# Patient Record
Sex: MALE | Race: WHITE | Employment: OTHER | URBAN - METROPOLITAN AREA
[De-identification: names, ages, dates, MRNs, and addresses within clinical notes are randomized per-mention and may not be internally consistent; named-entity substitution may affect disease eponyms.]

---

## 2019-01-28 ENCOUNTER — HOSPITAL ENCOUNTER (OUTPATIENT)
Dept: LAB | Facility: CLINIC | Age: 84
End: 2019-01-28
Attending: ORTHOPAEDIC SURGERY

## 2019-02-27 RX ORDER — HYDROCHLOROTHIAZIDE 25 MG/1
37.5 TABLET ORAL DAILY
COMMUNITY

## 2019-03-01 RX ORDER — LANOLIN ALCOHOL/MO/W.PET/CERES
400 CREAM (GRAM) TOPICAL DAILY
COMMUNITY

## 2019-03-01 RX ORDER — ASPIRIN 81 MG/1
81 TABLET ORAL DAILY
Status: ON HOLD | COMMUNITY
End: 2019-03-06

## 2019-03-01 RX ORDER — MULTIVIT-MIN/IRON/FOLIC ACID/K 18-600-40
1000 CAPSULE ORAL DAILY
COMMUNITY

## 2019-03-01 RX ORDER — UBIDECARENONE 100 MG
100 CAPSULE ORAL DAILY
COMMUNITY

## 2019-03-01 RX ORDER — METAPROTERENOL SULFATE 10 MG
500 TABLET ORAL DAILY
COMMUNITY

## 2019-03-01 RX ORDER — FLUTICASONE PROPIONATE 50 MCG
2 SPRAY, SUSPENSION (ML) NASAL DAILY
COMMUNITY

## 2019-03-04 ENCOUNTER — APPOINTMENT (OUTPATIENT)
Dept: GENERAL RADIOLOGY | Facility: CLINIC | Age: 84
DRG: 470 | End: 2019-03-04
Attending: ORTHOPAEDIC SURGERY
Payer: MEDICARE

## 2019-03-04 ENCOUNTER — ANESTHESIA (OUTPATIENT)
Dept: SURGERY | Facility: CLINIC | Age: 84
DRG: 470 | End: 2019-03-04
Payer: MEDICARE

## 2019-03-04 ENCOUNTER — ANESTHESIA EVENT (OUTPATIENT)
Dept: SURGERY | Facility: CLINIC | Age: 84
DRG: 470 | End: 2019-03-04
Payer: MEDICARE

## 2019-03-04 ENCOUNTER — HOSPITAL ENCOUNTER (INPATIENT)
Facility: CLINIC | Age: 84
LOS: 3 days | Discharge: HOME | DRG: 470 | End: 2019-03-07
Attending: ORTHOPAEDIC SURGERY | Admitting: ORTHOPAEDIC SURGERY
Payer: MEDICARE

## 2019-03-04 ENCOUNTER — APPOINTMENT (OUTPATIENT)
Dept: PHYSICAL THERAPY | Facility: CLINIC | Age: 84
DRG: 470 | End: 2019-03-04
Attending: ORTHOPAEDIC SURGERY
Payer: MEDICARE

## 2019-03-04 DIAGNOSIS — Z96.659 STATUS POST TOTAL KNEE REPLACEMENT, UNSPECIFIED LATERALITY: Primary | ICD-10-CM

## 2019-03-04 LAB
ANION GAP SERPL CALCULATED.3IONS-SCNC: 8 MMOL/L (ref 3–14)
BUN SERPL-MCNC: 27 MG/DL (ref 7–30)
CALCIUM SERPL-MCNC: 9 MG/DL (ref 8.5–10.1)
CHLORIDE SERPL-SCNC: 108 MMOL/L (ref 94–109)
CO2 SERPL-SCNC: 26 MMOL/L (ref 20–32)
CREAT SERPL-MCNC: 1.03 MG/DL (ref 0.66–1.25)
GFR SERPL CREATININE-BSD FRML MDRD: 67 ML/MIN/{1.73_M2}
GLUCOSE SERPL-MCNC: 101 MG/DL (ref 70–99)
HGB BLD-MCNC: 14.8 G/DL (ref 13.3–17.7)
PLATELET # BLD AUTO: 220 10E9/L (ref 150–450)
POTASSIUM SERPL-SCNC: 3.5 MMOL/L (ref 3.4–5.3)
SODIUM SERPL-SCNC: 142 MMOL/L (ref 133–144)

## 2019-03-04 PROCEDURE — 97161 PT EVAL LOW COMPLEX 20 MIN: CPT | Mod: GP

## 2019-03-04 PROCEDURE — C1776 JOINT DEVICE (IMPLANTABLE): HCPCS | Performed by: ORTHOPAEDIC SURGERY

## 2019-03-04 PROCEDURE — 37000008 ZZH ANESTHESIA TECHNICAL FEE, 1ST 30 MIN: Performed by: ORTHOPAEDIC SURGERY

## 2019-03-04 PROCEDURE — 36000093 ZZH SURGERY LEVEL 4 1ST 30 MIN: Performed by: ORTHOPAEDIC SURGERY

## 2019-03-04 PROCEDURE — 25000128 H RX IP 250 OP 636: Performed by: ORTHOPAEDIC SURGERY

## 2019-03-04 PROCEDURE — 25000128 H RX IP 250 OP 636: Performed by: NURSE ANESTHETIST, CERTIFIED REGISTERED

## 2019-03-04 PROCEDURE — 25800030 ZZH RX IP 258 OP 636: Performed by: ORTHOPAEDIC SURGERY

## 2019-03-04 PROCEDURE — 25000128 H RX IP 250 OP 636: Performed by: ANESTHESIOLOGY

## 2019-03-04 PROCEDURE — 25000125 ZZHC RX 250: Performed by: ORTHOPAEDIC SURGERY

## 2019-03-04 PROCEDURE — 85049 AUTOMATED PLATELET COUNT: CPT | Performed by: ORTHOPAEDIC SURGERY

## 2019-03-04 PROCEDURE — 25800030 ZZH RX IP 258 OP 636: Performed by: ANESTHESIOLOGY

## 2019-03-04 PROCEDURE — 37000009 ZZH ANESTHESIA TECHNICAL FEE, EACH ADDTL 15 MIN: Performed by: ORTHOPAEDIC SURGERY

## 2019-03-04 PROCEDURE — 80048 BASIC METABOLIC PNL TOTAL CA: CPT | Performed by: ORTHOPAEDIC SURGERY

## 2019-03-04 PROCEDURE — 25000125 ZZHC RX 250: Performed by: NURSE ANESTHETIST, CERTIFIED REGISTERED

## 2019-03-04 PROCEDURE — 27810169 ZZH OR IMPLANT GENERAL: Performed by: ORTHOPAEDIC SURGERY

## 2019-03-04 PROCEDURE — 97530 THERAPEUTIC ACTIVITIES: CPT | Mod: GP

## 2019-03-04 PROCEDURE — 27210794 ZZH OR GENERAL SUPPLY STERILE: Performed by: ORTHOPAEDIC SURGERY

## 2019-03-04 PROCEDURE — 12000000 ZZH R&B MED SURG/OB

## 2019-03-04 PROCEDURE — 97110 THERAPEUTIC EXERCISES: CPT | Mod: GP

## 2019-03-04 PROCEDURE — 36000063 ZZH SURGERY LEVEL 4 EA 15 ADDTL MIN: Performed by: ORTHOPAEDIC SURGERY

## 2019-03-04 PROCEDURE — 36415 COLL VENOUS BLD VENIPUNCTURE: CPT | Performed by: ORTHOPAEDIC SURGERY

## 2019-03-04 PROCEDURE — 25000132 ZZH RX MED GY IP 250 OP 250 PS 637: Mod: GY | Performed by: ORTHOPAEDIC SURGERY

## 2019-03-04 PROCEDURE — 25800030 ZZH RX IP 258 OP 636: Performed by: NURSE ANESTHETIST, CERTIFIED REGISTERED

## 2019-03-04 PROCEDURE — 0SRD0J9 REPLACEMENT OF LEFT KNEE JOINT WITH SYNTHETIC SUBSTITUTE, CEMENTED, OPEN APPROACH: ICD-10-PCS | Performed by: ORTHOPAEDIC SURGERY

## 2019-03-04 PROCEDURE — 40000170 ZZH STATISTIC PRE-PROCEDURE ASSESSMENT II: Performed by: ORTHOPAEDIC SURGERY

## 2019-03-04 PROCEDURE — 27110028 ZZH OR GENERAL SUPPLY NON-STERILE: Performed by: ORTHOPAEDIC SURGERY

## 2019-03-04 PROCEDURE — A9270 NON-COVERED ITEM OR SERVICE: HCPCS | Mod: GY | Performed by: ORTHOPAEDIC SURGERY

## 2019-03-04 PROCEDURE — 85018 HEMOGLOBIN: CPT | Performed by: ORTHOPAEDIC SURGERY

## 2019-03-04 PROCEDURE — 71000012 ZZH RECOVERY PHASE 1 LEVEL 1 FIRST HR: Performed by: ORTHOPAEDIC SURGERY

## 2019-03-04 PROCEDURE — 40000986 XR KNEE PORT LT 1/2 VW: Mod: LT

## 2019-03-04 PROCEDURE — 97116 GAIT TRAINING THERAPY: CPT | Mod: GP

## 2019-03-04 DEVICE — IMP COMP FEMORAL S&N LEGION PS NP SZ7 LT 71423227: Type: IMPLANTABLE DEVICE | Site: KNEE | Status: FUNCTIONAL

## 2019-03-04 DEVICE — IMP COMP PATELLA SNN GEN II RESURFACING 38MM 71926225: Type: IMPLANTABLE DEVICE | Site: KNEE | Status: FUNCTIONAL

## 2019-03-04 DEVICE — BONE CEMENT RADIOPAQUE SIMPLEX HV FULL DOSE 6194-1-001: Type: IMPLANTABLE DEVICE | Site: KNEE | Status: FUNCTIONAL

## 2019-03-04 DEVICE — IMP BASEPLATE TIBIAL GENESIS II SZ 6 LT TI 71420170: Type: IMPLANTABLE DEVICE | Site: KNEE | Status: FUNCTIONAL

## 2019-03-04 DEVICE — IMP INSERT TIB S&N LGN PS HI FLEX XLPE SZ5-6 15MM 71453224: Type: IMPLANTABLE DEVICE | Site: KNEE | Status: FUNCTIONAL

## 2019-03-04 RX ORDER — PROPOFOL 10 MG/ML
INJECTION, EMULSION INTRAVENOUS CONTINUOUS PRN
Status: DISCONTINUED | OUTPATIENT
Start: 2019-03-04 | End: 2019-03-04

## 2019-03-04 RX ORDER — ACETAMINOPHEN 325 MG/1
650 TABLET ORAL EVERY 4 HOURS PRN
Status: DISCONTINUED | OUTPATIENT
Start: 2019-03-07 | End: 2019-03-07 | Stop reason: HOSPADM

## 2019-03-04 RX ORDER — PROCHLORPERAZINE MALEATE 5 MG
5 TABLET ORAL EVERY 6 HOURS PRN
Status: DISCONTINUED | OUTPATIENT
Start: 2019-03-04 | End: 2019-03-07 | Stop reason: HOSPADM

## 2019-03-04 RX ORDER — ONDANSETRON 4 MG/1
4 TABLET, ORALLY DISINTEGRATING ORAL EVERY 30 MIN PRN
Status: DISCONTINUED | OUTPATIENT
Start: 2019-03-04 | End: 2019-03-04 | Stop reason: HOSPADM

## 2019-03-04 RX ORDER — ONDANSETRON 2 MG/ML
INJECTION INTRAMUSCULAR; INTRAVENOUS PRN
Status: DISCONTINUED | OUTPATIENT
Start: 2019-03-04 | End: 2019-03-04

## 2019-03-04 RX ORDER — FENTANYL CITRATE 50 UG/ML
INJECTION, SOLUTION INTRAMUSCULAR; INTRAVENOUS PRN
Status: DISCONTINUED | OUTPATIENT
Start: 2019-03-04 | End: 2019-03-04

## 2019-03-04 RX ORDER — BUPIVACAINE HYDROCHLORIDE 7.5 MG/ML
INJECTION, SOLUTION INTRASPINAL PRN
Status: DISCONTINUED | OUTPATIENT
Start: 2019-03-04 | End: 2019-03-04

## 2019-03-04 RX ORDER — HYDROMORPHONE HYDROCHLORIDE 1 MG/ML
.3-.5 INJECTION, SOLUTION INTRAMUSCULAR; INTRAVENOUS; SUBCUTANEOUS EVERY 5 MIN PRN
Status: DISCONTINUED | OUTPATIENT
Start: 2019-03-04 | End: 2019-03-04 | Stop reason: HOSPADM

## 2019-03-04 RX ORDER — MAGNESIUM HYDROXIDE 1200 MG/15ML
LIQUID ORAL PRN
Status: DISCONTINUED | OUTPATIENT
Start: 2019-03-04 | End: 2019-03-04 | Stop reason: HOSPADM

## 2019-03-04 RX ORDER — DEXTROSE MONOHYDRATE, SODIUM CHLORIDE, AND POTASSIUM CHLORIDE 50; 1.49; 4.5 G/1000ML; G/1000ML; G/1000ML
INJECTION, SOLUTION INTRAVENOUS CONTINUOUS
Status: DISCONTINUED | OUTPATIENT
Start: 2019-03-04 | End: 2019-03-05 | Stop reason: CLARIF

## 2019-03-04 RX ORDER — LIDOCAINE 40 MG/G
CREAM TOPICAL
Status: DISCONTINUED | OUTPATIENT
Start: 2019-03-04 | End: 2019-03-07

## 2019-03-04 RX ORDER — FENTANYL CITRATE 50 UG/ML
25-50 INJECTION, SOLUTION INTRAMUSCULAR; INTRAVENOUS
Status: DISCONTINUED | OUTPATIENT
Start: 2019-03-04 | End: 2019-03-04 | Stop reason: HOSPADM

## 2019-03-04 RX ORDER — ONDANSETRON 4 MG/1
4 TABLET, ORALLY DISINTEGRATING ORAL EVERY 6 HOURS PRN
Status: DISCONTINUED | OUTPATIENT
Start: 2019-03-04 | End: 2019-03-07 | Stop reason: HOSPADM

## 2019-03-04 RX ORDER — CEFAZOLIN SODIUM 1 G/3ML
1 INJECTION, POWDER, FOR SOLUTION INTRAMUSCULAR; INTRAVENOUS SEE ADMIN INSTRUCTIONS
Status: DISCONTINUED | OUTPATIENT
Start: 2019-03-04 | End: 2019-03-04

## 2019-03-04 RX ORDER — FENTANYL CITRATE 50 UG/ML
100 INJECTION, SOLUTION INTRAMUSCULAR; INTRAVENOUS ONCE
Status: COMPLETED | OUTPATIENT
Start: 2019-03-04 | End: 2019-03-04

## 2019-03-04 RX ORDER — AMOXICILLIN 250 MG
1 CAPSULE ORAL 2 TIMES DAILY
Status: DISCONTINUED | OUTPATIENT
Start: 2019-03-04 | End: 2019-03-07 | Stop reason: HOSPADM

## 2019-03-04 RX ORDER — NALOXONE HYDROCHLORIDE 0.4 MG/ML
.1-.4 INJECTION, SOLUTION INTRAMUSCULAR; INTRAVENOUS; SUBCUTANEOUS
Status: DISCONTINUED | OUTPATIENT
Start: 2019-03-04 | End: 2019-03-07 | Stop reason: HOSPADM

## 2019-03-04 RX ORDER — DEXAMETHASONE SODIUM PHOSPHATE 4 MG/ML
4 INJECTION, SOLUTION INTRA-ARTICULAR; INTRALESIONAL; INTRAMUSCULAR; INTRAVENOUS; SOFT TISSUE
Status: DISCONTINUED | OUTPATIENT
Start: 2019-03-04 | End: 2019-03-04 | Stop reason: HOSPADM

## 2019-03-04 RX ORDER — ONDANSETRON 2 MG/ML
4 INJECTION INTRAMUSCULAR; INTRAVENOUS EVERY 6 HOURS PRN
Status: DISCONTINUED | OUTPATIENT
Start: 2019-03-04 | End: 2019-03-07 | Stop reason: HOSPADM

## 2019-03-04 RX ORDER — ACETAMINOPHEN 325 MG/1
975 TABLET ORAL EVERY 8 HOURS
Status: COMPLETED | OUTPATIENT
Start: 2019-03-04 | End: 2019-03-07

## 2019-03-04 RX ORDER — SODIUM CHLORIDE, SODIUM LACTATE, POTASSIUM CHLORIDE, CALCIUM CHLORIDE 600; 310; 30; 20 MG/100ML; MG/100ML; MG/100ML; MG/100ML
INJECTION, SOLUTION INTRAVENOUS CONTINUOUS
Status: DISCONTINUED | OUTPATIENT
Start: 2019-03-04 | End: 2019-03-04 | Stop reason: HOSPADM

## 2019-03-04 RX ORDER — DIPHENHYDRAMINE HYDROCHLORIDE 50 MG/ML
12.5 INJECTION INTRAMUSCULAR; INTRAVENOUS EVERY 6 HOURS PRN
Status: DISCONTINUED | OUTPATIENT
Start: 2019-03-04 | End: 2019-03-07 | Stop reason: HOSPADM

## 2019-03-04 RX ORDER — SODIUM CHLORIDE, SODIUM LACTATE, POTASSIUM CHLORIDE, CALCIUM CHLORIDE 600; 310; 30; 20 MG/100ML; MG/100ML; MG/100ML; MG/100ML
INJECTION, SOLUTION INTRAVENOUS CONTINUOUS
Status: DISCONTINUED | OUTPATIENT
Start: 2019-03-04 | End: 2019-03-04 | Stop reason: ALTCHOICE

## 2019-03-04 RX ORDER — ONDANSETRON 2 MG/ML
4 INJECTION INTRAMUSCULAR; INTRAVENOUS EVERY 30 MIN PRN
Status: DISCONTINUED | OUTPATIENT
Start: 2019-03-04 | End: 2019-03-04 | Stop reason: HOSPADM

## 2019-03-04 RX ORDER — METHOCARBAMOL 500 MG/1
500 TABLET, FILM COATED ORAL 4 TIMES DAILY PRN
Status: DISCONTINUED | OUTPATIENT
Start: 2019-03-04 | End: 2019-03-07 | Stop reason: HOSPADM

## 2019-03-04 RX ORDER — EPHEDRINE SULFATE 50 MG/ML
INJECTION, SOLUTION INTRAMUSCULAR; INTRAVENOUS; SUBCUTANEOUS PRN
Status: DISCONTINUED | OUTPATIENT
Start: 2019-03-04 | End: 2019-03-04

## 2019-03-04 RX ORDER — HYDROMORPHONE HYDROCHLORIDE 1 MG/ML
.3-.5 INJECTION, SOLUTION INTRAMUSCULAR; INTRAVENOUS; SUBCUTANEOUS
Status: DISCONTINUED | OUTPATIENT
Start: 2019-03-04 | End: 2019-03-07 | Stop reason: HOSPADM

## 2019-03-04 RX ORDER — AMOXICILLIN 250 MG
2 CAPSULE ORAL 2 TIMES DAILY
Status: DISCONTINUED | OUTPATIENT
Start: 2019-03-04 | End: 2019-03-07 | Stop reason: HOSPADM

## 2019-03-04 RX ORDER — CEFAZOLIN SODIUM 1 G/3ML
INJECTION, POWDER, FOR SOLUTION INTRAMUSCULAR; INTRAVENOUS PRN
Status: DISCONTINUED | OUTPATIENT
Start: 2019-03-04 | End: 2019-03-04

## 2019-03-04 RX ORDER — OXYCODONE HYDROCHLORIDE 5 MG/1
5-10 TABLET ORAL
Status: DISCONTINUED | OUTPATIENT
Start: 2019-03-04 | End: 2019-03-06

## 2019-03-04 RX ORDER — NALOXONE HYDROCHLORIDE 0.4 MG/ML
.1-.4 INJECTION, SOLUTION INTRAMUSCULAR; INTRAVENOUS; SUBCUTANEOUS
Status: ACTIVE | OUTPATIENT
Start: 2019-03-04 | End: 2019-03-05

## 2019-03-04 RX ORDER — LABETALOL 20 MG/4 ML (5 MG/ML) INTRAVENOUS SYRINGE
10
Status: DISCONTINUED | OUTPATIENT
Start: 2019-03-04 | End: 2019-03-04 | Stop reason: HOSPADM

## 2019-03-04 RX ORDER — DIPHENHYDRAMINE HCL 12.5MG/5ML
12.5 LIQUID (ML) ORAL EVERY 6 HOURS PRN
Status: DISCONTINUED | OUTPATIENT
Start: 2019-03-04 | End: 2019-03-07 | Stop reason: HOSPADM

## 2019-03-04 RX ORDER — CEFAZOLIN SODIUM 2 G/100ML
2 INJECTION, SOLUTION INTRAVENOUS EVERY 8 HOURS
Status: COMPLETED | OUTPATIENT
Start: 2019-03-04 | End: 2019-03-05

## 2019-03-04 RX ORDER — HYDRALAZINE HYDROCHLORIDE 20 MG/ML
2.5-5 INJECTION INTRAMUSCULAR; INTRAVENOUS EVERY 10 MIN PRN
Status: DISCONTINUED | OUTPATIENT
Start: 2019-03-04 | End: 2019-03-04 | Stop reason: HOSPADM

## 2019-03-04 RX ORDER — CEFAZOLIN SODIUM 2 G/100ML
2 INJECTION, SOLUTION INTRAVENOUS
Status: COMPLETED | OUTPATIENT
Start: 2019-03-04 | End: 2019-03-04

## 2019-03-04 RX ADMIN — SODIUM CHLORIDE 1 G: 9 INJECTION, SOLUTION INTRAVENOUS at 07:47

## 2019-03-04 RX ADMIN — DEXMEDETOMIDINE HYDROCHLORIDE 4 MCG: 100 INJECTION, SOLUTION INTRAVENOUS at 09:12

## 2019-03-04 RX ADMIN — SODIUM CHLORIDE, POTASSIUM CHLORIDE, SODIUM LACTATE AND CALCIUM CHLORIDE: 600; 310; 30; 20 INJECTION, SOLUTION INTRAVENOUS at 06:11

## 2019-03-04 RX ADMIN — ONDANSETRON 4 MG: 2 INJECTION INTRAMUSCULAR; INTRAVENOUS at 09:14

## 2019-03-04 RX ADMIN — ACETAMINOPHEN 975 MG: 325 TABLET, FILM COATED ORAL at 16:25

## 2019-03-04 RX ADMIN — POTASSIUM CHLORIDE, DEXTROSE MONOHYDRATE AND SODIUM CHLORIDE: 150; 5; 450 INJECTION, SOLUTION INTRAVENOUS at 11:45

## 2019-03-04 RX ADMIN — HYDROCHLOROTHIAZIDE 37.5 MG: 25 TABLET ORAL at 13:51

## 2019-03-04 RX ADMIN — OXYCODONE HYDROCHLORIDE 5 MG: 5 TABLET ORAL at 21:43

## 2019-03-04 RX ADMIN — CEFAZOLIN 1 G: 1 INJECTION, POWDER, FOR SOLUTION INTRAMUSCULAR; INTRAVENOUS at 09:22

## 2019-03-04 RX ADMIN — Medication 5 MG: at 08:30

## 2019-03-04 RX ADMIN — BUPIVACAINE HYDROCHLORIDE IN DEXTROSE 12.5 MG: 7.5 INJECTION, SOLUTION SUBARACHNOID at 07:35

## 2019-03-04 RX ADMIN — Medication 5 MG: at 08:13

## 2019-03-04 RX ADMIN — SENNOSIDES AND DOCUSATE SODIUM 2 TABLET: 8.6; 5 TABLET ORAL at 21:43

## 2019-03-04 RX ADMIN — FENTANYL CITRATE 25 MCG: 50 INJECTION, SOLUTION INTRAMUSCULAR; INTRAVENOUS at 09:16

## 2019-03-04 RX ADMIN — DEXMEDETOMIDINE HYDROCHLORIDE 4 MCG: 100 INJECTION, SOLUTION INTRAVENOUS at 09:09

## 2019-03-04 RX ADMIN — SODIUM CHLORIDE, POTASSIUM CHLORIDE, SODIUM LACTATE AND CALCIUM CHLORIDE: 600; 310; 30; 20 INJECTION, SOLUTION INTRAVENOUS at 06:46

## 2019-03-04 RX ADMIN — OXYCODONE HYDROCHLORIDE 5 MG: 5 TABLET ORAL at 16:28

## 2019-03-04 RX ADMIN — FENTANYL CITRATE 50 MCG: 50 INJECTION, SOLUTION INTRAMUSCULAR; INTRAVENOUS at 07:44

## 2019-03-04 RX ADMIN — FENTANYL CITRATE 25 MCG: 50 INJECTION, SOLUTION INTRAMUSCULAR; INTRAVENOUS at 09:15

## 2019-03-04 RX ADMIN — HYDROMORPHONE HYDROCHLORIDE 0.3 MG: 1 INJECTION, SOLUTION INTRAMUSCULAR; INTRAVENOUS; SUBCUTANEOUS at 12:49

## 2019-03-04 RX ADMIN — ACETAMINOPHEN 975 MG: 325 TABLET, FILM COATED ORAL at 21:43

## 2019-03-04 RX ADMIN — CEFAZOLIN SODIUM 2 G: 2 INJECTION, SOLUTION INTRAVENOUS at 07:35

## 2019-03-04 RX ADMIN — SODIUM CHLORIDE, POTASSIUM CHLORIDE, SODIUM LACTATE AND CALCIUM CHLORIDE: 600; 310; 30; 20 INJECTION, SOLUTION INTRAVENOUS at 07:58

## 2019-03-04 RX ADMIN — CEFAZOLIN SODIUM 2 G: 2 INJECTION, SOLUTION INTRAVENOUS at 16:27

## 2019-03-04 RX ADMIN — DEXMEDETOMIDINE HYDROCHLORIDE 4 MCG: 100 INJECTION, SOLUTION INTRAVENOUS at 07:34

## 2019-03-04 RX ADMIN — Medication 5 MG: at 07:56

## 2019-03-04 RX ADMIN — PROPOFOL 40 MCG/KG/MIN: 10 INJECTION, EMULSION INTRAVENOUS at 07:48

## 2019-03-04 RX ADMIN — Medication 0.5 MG: at 10:58

## 2019-03-04 RX ADMIN — Medication 5 MG: at 08:15

## 2019-03-04 RX ADMIN — Medication 5 MG: at 09:04

## 2019-03-04 RX ADMIN — Medication 0.5 MG: at 11:03

## 2019-03-04 RX ADMIN — FENTANYL CITRATE 50 MCG: 50 INJECTION, SOLUTION INTRAMUSCULAR; INTRAVENOUS at 06:56

## 2019-03-04 RX ADMIN — MIDAZOLAM HYDROCHLORIDE 1 MG: 1 INJECTION, SOLUTION INTRAMUSCULAR; INTRAVENOUS at 06:55

## 2019-03-04 RX ADMIN — SODIUM CHLORIDE 1 G: 9 INJECTION, SOLUTION INTRAVENOUS at 09:01

## 2019-03-04 RX ADMIN — DEXMEDETOMIDINE HYDROCHLORIDE 4 MCG: 100 INJECTION, SOLUTION INTRAVENOUS at 07:37

## 2019-03-04 ASSESSMENT — ACTIVITIES OF DAILY LIVING (ADL)
ADLS_ACUITY_SCORE: 13
ADLS_ACUITY_SCORE: 15
ADLS_ACUITY_SCORE: 13

## 2019-03-04 ASSESSMENT — MIFFLIN-ST. JEOR: SCORE: 1705.97

## 2019-03-04 NOTE — ANESTHESIA PROCEDURE NOTES
Peripheral nerve/Neuraxial procedure note : intrathecal  Pre-Procedure  Performed by Thong Upton MD  Location: OR      Pre-Anesthestic Checklist: patient identified, IV checked, site marked, risks and benefits discussed, informed consent, monitors and equipment checked, pre-op evaluation, at physician/surgeon's request and post-op pain management    Timeout  Correct Patient: Yes   Correct Procedure: Yes   Correct Site: Yes   Correct Laterality: N/A   Correct Position: Yes   Site Marked: N/A   .   Procedure Documentation    .    Procedure:    Intrathecal.  Insertion Site:L3-4  (midline approach)      Patient Prep;mask, sterile gloves, povidone-iodine 7.5% surgical scrub, patient draped.  .  Needle: Clarita-Shyam Spinal Needle (gauge): 24  Spinal/LP Needle Length (inches): 3 # of attempts: 2 and  # of redirects:  2 (0) Introducer used Introducer: 20 G .       Assessment/Narrative  Paresthesias: No.  .  .  clear CSF fluid removed . Comments:  12.5 mg 0.75% BUPIVACAINE WITH 8.25% DEXTROSE INJECTED. No paresthesia on injection. Patient tolerated the procedure well.

## 2019-03-04 NOTE — ANESTHESIA PROCEDURE NOTES
Peripheral nerve/Neuraxial procedure note : Adductor canal and Femoral  Pre-Procedure  Performed by Thong Upton MD  Location: pre-op      Pre-Anesthestic Checklist: patient identified, IV checked, site marked, risks and benefits discussed, informed consent, monitors and equipment checked, pre-op evaluation, at physician/surgeon's request and post-op pain management    Timeout  Correct Patient: Yes   Correct Procedure: Yes   Correct Site: Yes   Correct Laterality: Yes   Correct Position: Yes   Site Marked: Yes   .   Procedure Documentation    .    Procedure:  left  Adductor canal and Femoral.  Local skin infiltrated with 3 mL of 1% lidocaine.     Ultrasound used to identify targeted nerve, plexus, or vascular marker and placed a needle adjacent to it., Ultrasound was used to visualize the spread of the anesthetic in close proximity to the above stated nerve. A permanent image is entered into the patient's record.  Patient Prep;mask, sterile gloves, chlorhexidine gluconate and isopropyl alcohol.  .  Needle: insulated, short bevel ([place]) Needle Gauge: 21.  Needle Length (millimeters) 100  Insertion Method: Single Shot.       Assessment/Narrative  Paresthesias: No.  Injection made incrementally with aspirations every 5 mL..  The placement was negative for: blood aspirated, painful injection and site bleeding.  Bolus given via needle..   Secured via.   Complications: none. Test dose of mL at. Test dose negative for signs of intravascular, subdural or intrathecal injection. Comments:  0.5% Bupivacaine with 1:400,000 epinephrine injected. Patient tolerated the procedure well.    The surgeon has given a verbal order transferring care of this patient to me for the performance of a regional analgesia block for post operative pain control. It is requested of me because I am uniquely trained and qualified to perform this block and the surgeon is neither trained nor qualified to perform this procedure.

## 2019-03-04 NOTE — PROGRESS NOTES
03/04/19 1509   Quick Adds   Type of Visit Initial PT Evaluation   Living Environment   Lives With spouse   Living Arrangements house   Home Accessibility stairs to enter home   Number of Stairs, Main Entrance two   Transportation Anticipated family or friend will provide   Living Environment Comment Walk in shower, no rails on steps to enter home.  Patient and spouse drive.   Self-Care   Usual Activity Tolerance moderate   Current Activity Tolerance poor   Regular Exercise No   Equipment Currently Used at Home none   Activity/Exercise/Self-Care Comment Previously used to walker, no formal exercise.  Has walker and cane available at home.  No grab bars in bathroom.   Functional Level Prior   Ambulation 0-->independent   Transferring 0-->independent   Toileting 0-->independent   Bathing 0-->independent   Fall history within last six months no   General Information   Onset of Illness/Injury or Date of Surgery - Date 03/04/19   Referring Physician Seamus Mahan   Patient/Family Goals Statement Per BPCI plan, home with spouse assist and OP PT.  Pt reports looking forward to getting back to walking.     Pertinent History of Current Problem (include personal factors and/or comorbidities that impact the POC) Pt is an 82 y/o male s/p L TKA   Precautions/Limitations fall precautions  (KI at night)   Weight-Bearing Status - LLE weight-bearing as tolerated   General Observations Pt reports feeling lightheaded, requesting milk.  Discussed with RN and obtained milk for patient.  See vitals flowsheet for BP.   Cognitive Status Examination   Orientation orientation to person, place and time   Level of Consciousness alert   Follows Commands and Answers Questions 100% of the time   Pain Assessment   Patient Currently in Pain Yes, see Vital Sign flowsheet   Range of Motion (ROM)   ROM Comment L knee ROM limited into flexion and extension, consistent s/p TKA.  Otherwise WNL.   Strength   Strength Comments LLE strength apparent with  LE ex and functional mobility, consistent s/p TKA. Min A to complete SLR , good strength in UE and RLE observed with bed mobility, transfers   Bed Mobility   Bed Mobility Comments MIn A at LLE for sup<->sit   Transfer Skill:  Sit to Stand   Level of [place]: Sit/Stand minimum assist (75% patients effort)   Physical Assist/Nonphysical Assist: Sit/Stand verbal cues;nonverbal cues (demo/gestures);1 person + 1 person to manage equipment   Weightbearing Restrictions: Sit/Stand weight-bearing as tolerated   Assistive Device for Transfer: Sit/Stand rolling walker   Transfer Skill: Stand to Sit   Level of [place]: Stand/Sit minimum assist (75% patients effort)   Physical Assist/Nonphysical Assist: Stand/Sit verbal cues;nonverbal cues (demo/gestures);1 person + 1 person to manage equipment   Weight-Bearing Restrictions: Stand/Sit weight-bearing as tolerated   Assistive Device: Stand to Sit rolling walker   Gait   Gait Comments see daily doc for additional gait training   Gait Skills   Level of [place]: Gait minimum assist (75% patients effort)   Physical Assist/Nonphysical Assist: Gait verbal cues;nonverbal cues (demo/gestures);1 person + 1 person to manage equipment   Weight-Bearing Restrictions: Gait weight-bearing as tolerated   Assistive Device for Transfer: Gait rolling walker   Gait Distance (5')   Balance   Balance Comments Fair with mena UE support of walker   Muscle Tone   Muscle Tone no deficits were identified   General Therapy Interventions   Planned Therapy Interventions bed mobility training;gait training;ROM;strengthening;stretching;transfer training;progressive activity/exercise   Clinical Impression   Criteria for Skilled Therapeutic Intervention yes, treatment indicated   PT Diagnosis difficulty in gait s/p L TKA   Influenced by the following impairments decreased L knee ROM, decreased LLE strength   Functional limitations due to impairments decreased I with bed mobility, transfers, gait for  "home mobility   Clinical Presentation Stable/Uncomplicated   Clinical Presentation Rationale progressing as anticipated POD 0   Clinical Decision Making (Complexity) Low complexity   Therapy Frequency` 2 times/day   Predicted Duration of Therapy Intervention (days/wks) 3 days   Anticipated Equipment Needs at Discharge (pt has own walker and cane)   Anticipated Discharge Disposition Home with Outpatient Therapy   Risk & Benefits of therapy have been explained Yes   Patient, Family & other staff in agreement with plan of care Yes   [facility] AM-PAC  \"6 Clicks\" V.2 Basic Mobility Inpatient Short Form   1. Turning from your back to your side while in a flat bed without using bedrails? 4 - None   2. Moving from lying on your back to sitting on the side of a flat bed without using bedrails? 3 - A Little   3. Moving to and from a bed to a chair (including a wheelchair)? 3 - A Little   4. Standing up from a chair using your arms (e.g., wheelchair, or bedside chair)? 3 - A Little   5. To walk in hospital room? 3 - A Little   6. Climbing 3-5 steps with a railing? 2 - A Lot   Basic Mobility Raw Score (Score out of 24.Lower scores equate to lower levels of function) 18   Total Evaluation Time   Total Evaluation Time (Minutes) 25     "

## 2019-03-04 NOTE — PLAN OF CARE
VSS, CMS intact. Up and ambulated with PT. Pain well managed with oxycodone and tylenol. Dressing C/D/I. Voiding per urinal. A&Ox 4.

## 2019-03-04 NOTE — ANESTHESIA CARE TRANSFER NOTE
Patient: Alec Villanueva    Procedure(s):  LEFT TOTAL KNEE ARTHROPLASTY (MOTTA AND NEPHEW)^    Diagnosis: LEFT KNEE DJD  Diagnosis Additional Information: No value filed.    Anesthesia Type:   Spinal     Note:  Airway :Room Air  Patient transferred to:PACU  Handoff Report: Identifed the Patient, Identified the Reponsible Provider, Reviewed the pertinent medical history, Discussed the surgical course, Reviewed Intra-OP anesthesia mangement and issues during anesthesia, Set expectations for post-procedure period and Allowed opportunity for questions and acknowledgement of understanding      Vitals: (Last set prior to Anesthesia Care Transfer)    CRNA VITALS  3/4/2019 0916 - 3/4/2019 0952      3/4/2019             SpO2:  97 %    Resp Rate (set):  10                Electronically Signed By: LUKE May CRNA  March 4, 2019  9:52 AM

## 2019-03-04 NOTE — PLAN OF CARE
Discharge Planner PT   Patient plan for discharge: Home with OP PT and spouse assist, per BPCI plan  Current status: Min A for bed mobility, transfers and gait with walker, amb 15'. BP stable but patient reporting lightheadedness throughout limiting ambulation distance.  Overall moving well POD 0.  Barriers to return to prior living situation: Stairs- will address in PT, has assist of spouse at home, already has FWW and SEC at home  Recommendations for discharge: home with OP PT  Rationale for recommendations: Pt moving well POD 0, anticipate will be able to meet mobility goals for home and attend OP PT to progress knee strength and ROM       Entered by: Pao Lopes 03/04/2019 4:16 PM

## 2019-03-04 NOTE — ANESTHESIA PREPROCEDURE EVALUATION
Anesthesia Pre-Procedure Evaluation    Patient: Alec Villanueva   MRN: [id number] : 1935          Preoperative Diagnosis: LEFT KNEE DJD    Procedure(s):  LEFT TOTAL KNEE ARTHROPLASTY (SMITH AND NEPHEW)^    Past Medical History:   Diagnosis Date     Hyperlipidemia      Hypertension      Thyroid nodule      Past Surgical History:   Procedure Laterality Date     ENT SURGERY      nasal septum surgery     EYE SURGERY      cataracts     EYE SURGERY      capsulotomy     HERNIA REPAIR       ORTHOPEDIC SURGERY Bilateral     shoulder arthroscopy     THYROIDECTOMY      partial       Anesthesia Evaluation     . Pt has had prior anesthetic.     No history of anesthetic complications          ROS/MED HX    ENT/Pulmonary:      (-) sleep apnea   Neurologic:  - neg neurologic ROS     Cardiovascular:     (+) Dyslipidemia, hypertension----. : . . . :. .       METS/Exercise Tolerance:     Hematologic:         Musculoskeletal:         GI/Hepatic:  - neg GI/hepatic ROS      (-) GERD   Renal/Genitourinary:  - ROS Renal section negative       Endo:     (+) thyroid problem  Thyroid disease - Other, .      Psychiatric:         Infectious Disease:         Malignancy:         Other:                          Physical Exam  Normal systems: cardiovascular and pulmonary    Airway   Mallampati: II  TM distance: >3 FB  Neck ROM: full    Dental   (+) caps    Cardiovascular       Pulmonary             Lab Results   Component Value Date    HGB 14.8 2019     2019    POTASSIUM 3.5 2019    CHLORIDE 108 2019    CO2 26 2019    BUN 27 2019    CR 1.03 2019     (H) 2019    ERNESTINA 9.0 2019       Preop Vitals  BP Readings from Last 3 Encounters:   19 138/76    Pulse Readings from Last 3 Encounters:   19 70      Resp Readings from Last 3 Encounters:   19 12    SpO2 Readings from Last 3 Encounters:   19 95%      Temp Readings from Last 1 Encounters:   19 36.5  C (97.7  " F) (Temporal)    Ht Readings from Last 1 Encounters:   03/04/19 1.854 m (6' 1\")      Wt Readings from Last 1 Encounters:   03/04/19 95.7 kg (211 lb)    Estimated body mass index is 27.84 kg/m  as calculated from the following:    Height as of this encounter: 1.854 m (6' 1\").    Weight as of this encounter: 95.7 kg (211 lb).       Anesthesia Plan      History & Physical Review  History and physical reviewed and following examination; no interval change.    ASA Status:  2 .    NPO Status:  > 8 hours    Plan for Spinal   PONV prophylaxis:  Ondansetron (or other 5HT-3)       Postoperative Care  Postoperative pain management:  Multi-modal analgesia.      Consents  Anesthetic plan, risks, benefits and alternatives discussed with:  Patient..                 Thong Upton MD  "

## 2019-03-04 NOTE — ANESTHESIA POSTPROCEDURE EVALUATION
Patient: Alec Villanueva    Procedure(s):  LEFT TOTAL KNEE ARTHROPLASTY (MOTAT AND NEPHEW)^    Diagnosis:LEFT KNEE DJD  Diagnosis Additional Information: No value filed.    Anesthesia Type:  Spinal    Note:  Anesthesia Post Evaluation    Patient location during evaluation: bedside  Patient participation: Able to participate in evaluation but full recovery from regional anesthesia has not yet ocurrred but is anticipated to occur within 48 hours  Level of consciousness: awake  Pain management: adequate  Airway patency: patent  Cardiovascular status: acceptable  Respiratory status: acceptable  Hydration status: acceptable  PONV: none     Anesthetic complications: None          Last vitals:  Vitals:    03/04/19 1541 03/04/19 1628 03/04/19 1630   BP: 143/75  147/74   Pulse:   65   Resp: 15 15 15   Temp: 36.4  C (97.6  F)  36.4  C (97.5  F)   SpO2: 100%  97%         Electronically Signed By: Alec Busby DO, DO  March 4, 2019  4:48 PM

## 2019-03-04 NOTE — OP NOTE
PATIENT NAME:  Alec Villanueva  MEDICAL RECORD #: [id number]  PATIENT BIRTHDAY:  1935  DATE OF SURGERY: 3/4/2019    SURGEON:    Seamus Mahan MD    1st ASSISTANT:  HANY Pereira OPA-C    PREOPERATIVE DIAGNOSIS:  Degenerative osteoarthritis left knee.    POSTOPERATIVE DIAGNOSIS:  Degenerative osteoarthritis left knee.    PROCEDURE: left total knee arthroplasty.    COMPONENTS: [employer] - Size 7 PS femoral component, size 6 fully stemmed tibial baseplate with 15 mm PS XLPE high flexion articular insert, and 38 mm patellar component.    ANESTHESIA: Spinal     INDICATIONS FOR PROCEDURE:  The patient was brought to the operating room for elective total knee replacement for advanced degenerative osteoarthritis.  The patient received IV antibiotics preoperatively.  These will be continued for 24 hours.  The patient also will receive anticoagulants  for postoperative thrombosis prophylaxis.  The patient understands the indications, alternatives, risks, benefits, and time involved for recovery and wishes to proceed.  The patient is consented for the procedure.      DESCRIPTION OF PROCEDURE:  The patient was brought to the operating room  and following suitable Spinal anesthesia, the left knee was prepped and draped in the usual manner.  Full timeout was carried out and the patient and proper extremity and operative site identified and confirmed by all members of the operative team.    We next exsanguinated the operative leg with a Angelo bandage and the tourniquet was elevated to 350 mmHg on the thigh.    A 10 cm longitudinal incision was made anteriorly for the MIS technique.  Sharp dissection was carried down through the subcutaneous tissue.  A median parapatellar arthrotomy was carried out and the knee flexed to 90 degrees.    There was severe wear in the medial compartment and severe wear in the patellofemoral  compartment and moderate wear in the lateral compartment.    The [employer] instrumentation was  used.  The femur was cut for a size 7 PS  implant.  The tibia was cut for a size 6 fully stemmed base plate.  The patella was cut for a 38 mm patellar button.    The trial components were removed from the knee.  The bone surfaces were prepared with jet lavage and careful drying technique.     The posterior capsule was injected for postoperative relief with 30 cc of saline, 30 cc of 0.2% Ropivacaine, and 15 mg of Toradol.       We then cemented the components with HD Simplex cement beginning with the tibial baseplate, followed by the femoral component, followed by the patellar component.    The knee was held in extension with the trial insert in place in the tibia until the cement was set.  Once the cement was set up, the trial component was removed.  We selected the 15 mm PS XLPE high flexion articular insert.  This insert was secured and the knee checked one final time for motion, stability, alignment and soft tissue balance, all of which were excellent.    The wound was irrigated throughout with antibiotic solution using jet lavage.  The tourniquet was deflated prior to wound closure and all bleeding controlled with electrocautery.  We then re-exsanguinated the leg and reinflated the tourniquet during wound closure.    The wound was closed over a medium Hemovac drain with spaced 0 Ethibond interrupted and V-Loc running suture in the parapatellar arthrotomy, 2-0 undyed Vicryl in subcutaneous tissue and skin staples in the skin.  A sterile soft dressing was applied.  The tourniquet deflated one final time.  The patient was taken to the PAR in satisfactory condition.  There were no known complications during the procedure.    A surgical assistant was medically necessary for this procedure for pre-op positioning as well as intra-op retraction and extremity support and positioning.  He was present for the entire procedure.      STEPHANIE JORGENSEN MD    CC  Stephanie Jorgensen MD          [phone] Fax

## 2019-03-04 NOTE — PLAN OF CARE
Pt. A&o, vss, taking Iv dilaudid for pain, dressing CDI, hvc patent, DTV, PT at 3pm. Will continue to monitor.

## 2019-03-05 ENCOUNTER — APPOINTMENT (OUTPATIENT)
Dept: CT IMAGING | Facility: CLINIC | Age: 84
DRG: 470 | End: 2019-03-05
Attending: NURSE PRACTITIONER
Payer: MEDICARE

## 2019-03-05 ENCOUNTER — APPOINTMENT (OUTPATIENT)
Dept: CARDIOLOGY | Facility: CLINIC | Age: 84
DRG: 470 | End: 2019-03-05
Attending: NURSE PRACTITIONER
Payer: MEDICARE

## 2019-03-05 ENCOUNTER — APPOINTMENT (OUTPATIENT)
Dept: PHYSICAL THERAPY | Facility: CLINIC | Age: 84
DRG: 470 | End: 2019-03-05
Attending: ORTHOPAEDIC SURGERY
Payer: MEDICARE

## 2019-03-05 LAB
ALBUMIN SERPL-MCNC: 3 G/DL (ref 3.4–5)
ALP SERPL-CCNC: 46 U/L (ref 40–150)
ALT SERPL W P-5'-P-CCNC: 19 U/L (ref 0–70)
ANION GAP SERPL CALCULATED.3IONS-SCNC: 5 MMOL/L (ref 3–14)
ANION GAP SERPL CALCULATED.3IONS-SCNC: 6 MMOL/L (ref 3–14)
AST SERPL W P-5'-P-CCNC: 21 U/L (ref 0–45)
BILIRUB SERPL-MCNC: 1.1 MG/DL (ref 0.2–1.3)
BUN SERPL-MCNC: 17 MG/DL (ref 7–30)
BUN SERPL-MCNC: 18 MG/DL (ref 7–30)
CALCIUM SERPL-MCNC: 8 MG/DL (ref 8.5–10.1)
CALCIUM SERPL-MCNC: 8.3 MG/DL (ref 8.5–10.1)
CHLORIDE SERPL-SCNC: 103 MMOL/L (ref 94–109)
CHLORIDE SERPL-SCNC: 104 MMOL/L (ref 94–109)
CO2 SERPL-SCNC: 27 MMOL/L (ref 20–32)
CO2 SERPL-SCNC: 30 MMOL/L (ref 20–32)
CREAT SERPL-MCNC: 0.85 MG/DL (ref 0.66–1.25)
CREAT SERPL-MCNC: 0.98 MG/DL (ref 0.66–1.25)
ERYTHROCYTE [DISTWIDTH] IN BLOOD BY AUTOMATED COUNT: 14.2 % (ref 10–15)
GFR SERPL CREATININE-BSD FRML MDRD: 71 ML/MIN/{1.73_M2}
GFR SERPL CREATININE-BSD FRML MDRD: 80 ML/MIN/{1.73_M2}
GLUCOSE BLDC GLUCOMTR-MCNC: 126 MG/DL (ref 70–99)
GLUCOSE SERPL-MCNC: 116 MG/DL (ref 70–99)
GLUCOSE SERPL-MCNC: 121 MG/DL (ref 70–99)
HCT VFR BLD AUTO: 37.2 % (ref 40–53)
HGB BLD-MCNC: 12.6 G/DL (ref 13.3–17.7)
HGB BLD-MCNC: 12.6 G/DL (ref 13.3–17.7)
LACTATE BLD-SCNC: 1.6 MMOL/L (ref 0.7–2)
MAGNESIUM SERPL-MCNC: 1.8 MG/DL (ref 1.6–2.3)
MCH RBC QN AUTO: 31.7 PG (ref 26.5–33)
MCHC RBC AUTO-ENTMCNC: 33.9 G/DL (ref 31.5–36.5)
MCV RBC AUTO: 94 FL (ref 78–100)
PLATELET # BLD AUTO: 170 10E9/L (ref 150–450)
PLATELET # BLD AUTO: 182 10E9/L (ref 150–450)
POTASSIUM SERPL-SCNC: 2.9 MMOL/L (ref 3.4–5.3)
POTASSIUM SERPL-SCNC: 3.8 MMOL/L (ref 3.4–5.3)
POTASSIUM SERPL-SCNC: 3.9 MMOL/L (ref 3.4–5.3)
PROT SERPL-MCNC: 6.7 G/DL (ref 6.8–8.8)
RBC # BLD AUTO: 3.98 10E12/L (ref 4.4–5.9)
SODIUM SERPL-SCNC: 137 MMOL/L (ref 133–144)
SODIUM SERPL-SCNC: 138 MMOL/L (ref 133–144)
TROPONIN I SERPL-MCNC: <0.015 UG/L (ref 0–0.04)
WBC # BLD AUTO: 12.6 10E9/L (ref 4–11)

## 2019-03-05 PROCEDURE — 93306 TTE W/DOPPLER COMPLETE: CPT | Mod: 26 | Performed by: INTERNAL MEDICINE

## 2019-03-05 PROCEDURE — 36415 COLL VENOUS BLD VENIPUNCTURE: CPT | Performed by: ORTHOPAEDIC SURGERY

## 2019-03-05 PROCEDURE — 21000001 ZZH R&B HEART CARE

## 2019-03-05 PROCEDURE — 80053 COMPREHEN METABOLIC PANEL: CPT | Performed by: NURSE PRACTITIONER

## 2019-03-05 PROCEDURE — 25000128 H RX IP 250 OP 636: Performed by: ORTHOPAEDIC SURGERY

## 2019-03-05 PROCEDURE — 25000125 ZZHC RX 250: Performed by: ORTHOPAEDIC SURGERY

## 2019-03-05 PROCEDURE — 36415 COLL VENOUS BLD VENIPUNCTURE: CPT | Performed by: NURSE PRACTITIONER

## 2019-03-05 PROCEDURE — 99233 SBSQ HOSP IP/OBS HIGH 50: CPT | Performed by: NURSE PRACTITIONER

## 2019-03-05 PROCEDURE — A9270 NON-COVERED ITEM OR SERVICE: HCPCS | Mod: GY | Performed by: ORTHOPAEDIC SURGERY

## 2019-03-05 PROCEDURE — 97530 THERAPEUTIC ACTIVITIES: CPT | Mod: GP | Performed by: PHYSICAL THERAPY ASSISTANT

## 2019-03-05 PROCEDURE — 25000128 H RX IP 250 OP 636: Performed by: NURSE PRACTITIONER

## 2019-03-05 PROCEDURE — 84132 ASSAY OF SERUM POTASSIUM: CPT | Performed by: INTERNAL MEDICINE

## 2019-03-05 PROCEDURE — 97110 THERAPEUTIC EXERCISES: CPT | Mod: GP | Performed by: PHYSICAL THERAPY ASSISTANT

## 2019-03-05 PROCEDURE — 84484 ASSAY OF TROPONIN QUANT: CPT | Performed by: NURSE PRACTITIONER

## 2019-03-05 PROCEDURE — 80048 BASIC METABOLIC PNL TOTAL CA: CPT | Performed by: ORTHOPAEDIC SURGERY

## 2019-03-05 PROCEDURE — 40000264 ECHOCARDIOGRAM COMPLETE

## 2019-03-05 PROCEDURE — 85018 HEMOGLOBIN: CPT | Performed by: ORTHOPAEDIC SURGERY

## 2019-03-05 PROCEDURE — 25500064 ZZH RX 255 OP 636: Performed by: ORTHOPAEDIC SURGERY

## 2019-03-05 PROCEDURE — 83605 ASSAY OF LACTIC ACID: CPT | Performed by: NURSE PRACTITIONER

## 2019-03-05 PROCEDURE — 36415 COLL VENOUS BLD VENIPUNCTURE: CPT | Performed by: INTERNAL MEDICINE

## 2019-03-05 PROCEDURE — 71260 CT THORAX DX C+: CPT

## 2019-03-05 PROCEDURE — 93005 ELECTROCARDIOGRAM TRACING: CPT

## 2019-03-05 PROCEDURE — 00000146 ZZHCL STATISTIC GLUCOSE BY METER IP

## 2019-03-05 PROCEDURE — 99207 ZZC CDG-CODE INCORRECT PER BILLING BASED ON TIME: CPT | Performed by: NURSE PRACTITIONER

## 2019-03-05 PROCEDURE — 93010 ELECTROCARDIOGRAM REPORT: CPT | Performed by: INTERNAL MEDICINE

## 2019-03-05 PROCEDURE — 97116 GAIT TRAINING THERAPY: CPT | Mod: GP | Performed by: PHYSICAL THERAPY ASSISTANT

## 2019-03-05 PROCEDURE — 85049 AUTOMATED PLATELET COUNT: CPT | Performed by: ORTHOPAEDIC SURGERY

## 2019-03-05 PROCEDURE — 85027 COMPLETE CBC AUTOMATED: CPT | Performed by: NURSE PRACTITIONER

## 2019-03-05 PROCEDURE — 83735 ASSAY OF MAGNESIUM: CPT | Performed by: NURSE PRACTITIONER

## 2019-03-05 PROCEDURE — 25000132 ZZH RX MED GY IP 250 OP 250 PS 637: Mod: GY | Performed by: ORTHOPAEDIC SURGERY

## 2019-03-05 PROCEDURE — 99222 1ST HOSP IP/OBS MODERATE 55: CPT | Mod: 25 | Performed by: INTERNAL MEDICINE

## 2019-03-05 RX ORDER — POTASSIUM CHLORIDE 29.8 MG/ML
20 INJECTION INTRAVENOUS
Status: DISCONTINUED | OUTPATIENT
Start: 2019-03-05 | End: 2019-03-07 | Stop reason: HOSPADM

## 2019-03-05 RX ORDER — POTASSIUM CL/LIDO/0.9 % NACL 10MEQ/0.1L
10 INTRAVENOUS SOLUTION, PIGGYBACK (ML) INTRAVENOUS
Status: DISCONTINUED | OUTPATIENT
Start: 2019-03-05 | End: 2019-03-07 | Stop reason: HOSPADM

## 2019-03-05 RX ORDER — POTASSIUM CHLORIDE 7.45 MG/ML
10 INJECTION INTRAVENOUS
Status: DISCONTINUED | OUTPATIENT
Start: 2019-03-05 | End: 2019-03-07 | Stop reason: HOSPADM

## 2019-03-05 RX ORDER — IOPAMIDOL 755 MG/ML
75 INJECTION, SOLUTION INTRAVASCULAR ONCE
Status: COMPLETED | OUTPATIENT
Start: 2019-03-05 | End: 2019-03-05

## 2019-03-05 RX ORDER — POTASSIUM CHLORIDE 1.5 G/1.58G
20-40 POWDER, FOR SOLUTION ORAL
Status: DISCONTINUED | OUTPATIENT
Start: 2019-03-05 | End: 2019-03-07 | Stop reason: HOSPADM

## 2019-03-05 RX ORDER — POTASSIUM CHLORIDE 1500 MG/1
20-40 TABLET, EXTENDED RELEASE ORAL
Status: DISCONTINUED | OUTPATIENT
Start: 2019-03-05 | End: 2019-03-07 | Stop reason: HOSPADM

## 2019-03-05 RX ORDER — MAGNESIUM SULFATE HEPTAHYDRATE 40 MG/ML
4 INJECTION, SOLUTION INTRAVENOUS EVERY 4 HOURS PRN
Status: DISCONTINUED | OUTPATIENT
Start: 2019-03-05 | End: 2019-03-07 | Stop reason: HOSPADM

## 2019-03-05 RX ORDER — LIDOCAINE 40 MG/G
CREAM TOPICAL
Status: DISCONTINUED | OUTPATIENT
Start: 2019-03-05 | End: 2019-03-07 | Stop reason: HOSPADM

## 2019-03-05 RX ORDER — MAGNESIUM SULFATE HEPTAHYDRATE 40 MG/ML
2 INJECTION, SOLUTION INTRAVENOUS DAILY PRN
Status: DISCONTINUED | OUTPATIENT
Start: 2019-03-05 | End: 2019-03-07 | Stop reason: HOSPADM

## 2019-03-05 RX ADMIN — HYDROCHLOROTHIAZIDE 37.5 MG: 25 TABLET ORAL at 08:14

## 2019-03-05 RX ADMIN — Medication 10 MEQ: at 14:47

## 2019-03-05 RX ADMIN — CEFAZOLIN SODIUM 2 G: 2 INJECTION, SOLUTION INTRAVENOUS at 01:22

## 2019-03-05 RX ADMIN — SENNOSIDES AND DOCUSATE SODIUM 2 TABLET: 8.6; 5 TABLET ORAL at 08:30

## 2019-03-05 RX ADMIN — Medication 10 MEQ: at 12:02

## 2019-03-05 RX ADMIN — Medication 10 MEQ: at 17:22

## 2019-03-05 RX ADMIN — HUMAN ALBUMIN MICROSPHERES AND PERFLUTREN 9 ML: 10; .22 INJECTION, SOLUTION INTRAVENOUS at 11:04

## 2019-03-05 RX ADMIN — Medication 10 MEQ: at 13:12

## 2019-03-05 RX ADMIN — Medication 10 MEQ: at 10:27

## 2019-03-05 RX ADMIN — SODIUM CHLORIDE 1000 ML: 9 INJECTION, SOLUTION INTRAVENOUS at 09:42

## 2019-03-05 RX ADMIN — SENNOSIDES AND DOCUSATE SODIUM 2 TABLET: 8.6; 5 TABLET ORAL at 22:12

## 2019-03-05 RX ADMIN — IOPAMIDOL 75 ML: 755 INJECTION, SOLUTION INTRAVENOUS at 11:41

## 2019-03-05 RX ADMIN — ACETAMINOPHEN 975 MG: 325 TABLET, FILM COATED ORAL at 22:12

## 2019-03-05 RX ADMIN — ENOXAPARIN SODIUM 40 MG: 40 INJECTION SUBCUTANEOUS at 08:16

## 2019-03-05 RX ADMIN — SODIUM CHLORIDE 98 ML: 9 INJECTION, SOLUTION INTRAVENOUS at 11:42

## 2019-03-05 RX ADMIN — ONDANSETRON 4 MG: 2 INJECTION INTRAMUSCULAR; INTRAVENOUS at 09:38

## 2019-03-05 RX ADMIN — ACETAMINOPHEN 975 MG: 325 TABLET, FILM COATED ORAL at 05:54

## 2019-03-05 RX ADMIN — HYDROMORPHONE HYDROCHLORIDE 0.3 MG: 1 INJECTION, SOLUTION INTRAMUSCULAR; INTRAVENOUS; SUBCUTANEOUS at 20:19

## 2019-03-05 RX ADMIN — OXYCODONE HYDROCHLORIDE 5 MG: 5 TABLET ORAL at 08:15

## 2019-03-05 RX ADMIN — ACETAMINOPHEN 975 MG: 325 TABLET, FILM COATED ORAL at 14:42

## 2019-03-05 RX ADMIN — PROCHLORPERAZINE EDISYLATE 5 MG: 5 INJECTION INTRAMUSCULAR; INTRAVENOUS at 20:19

## 2019-03-05 RX ADMIN — Medication 10 MEQ: at 16:10

## 2019-03-05 ASSESSMENT — ACTIVITIES OF DAILY LIVING (ADL)
ADLS_ACUITY_SCORE: 12

## 2019-03-05 NOTE — PLAN OF CARE
Discharge Planner PT   Patient plan for discharge: Home with OP PT and spouse assist, per BPCI plan  Current status: Pt transfered supine to sit with CGA and sit to stand to FWW and CGA. Gait training 20' with FWW and CGA with wheelchair follow. Pt felt lightheaded and needed to sit, patient passed out shortly after sitting in wheelchair Pt transfered from sit in wheelchair to supine in bed with maxA of 3 due to dizziness and decreased alertness after ambulation. Pt vitals after getting back in bed: 135/62 HR: 62. Nurse present to asses and assist with getting patient back in bed. RRT called. .  AAROM 16-42 degrees.   Barriers to return to prior living situation: Stairs- will address in PT, has assist of spouse at home, already has FWW and SEC at home   Recommendations for discharge: home with OP PT per plan established by the PT.  Rationale for recommendations:  Pending improvement and medical status, anticipate will be able to meet mobility goals for home and attend OP PT to progress knee strength and ROM           Entered by: Mindy Gray 03/05/2019 9:32 AM     PM PT cancelled due to patient [facility] the [facility].

## 2019-03-05 NOTE — CONSULTS
Seen urgently; no CP/no dyspnea - felt weak with HR 40's.  Echo - normal LVF, RV mildly to moderately enlarged.  Feeling slowly better.  Suspect vagal event, less likely pulmonary embolism.  Would do serial troponins.  Thanks.

## 2019-03-05 NOTE — PROGRESS NOTES
Rounded on patient at 0800. CMS intact. Alert and oriented. Dressing clean and dry. Hemovac patent. Eating breakfast-regular diet. Denies nausea. Vitals stable. Oxycodone 5 mg given prior to PT. Did ambulate with PT. Patient in hallway outside his room and complaining of light headedness. Did sit in wheelchair and become very diaphoretic and unresponsive. Returned to bed. Tele on- sinus dipti. O2 sat mid 90's on 2 liters. RRT called. Patient remained very diaphoretic and lethargic for first hour. Did answer question appropriate. Had emesis. Zofran given. NS bolus given. Labs drawn. Vitals remain stable-see flowsheet. Cardiology consult done. CT done prior to transfer to [facility]. Chase JEAN did call wife Allyson and informed Dr. Mahan. Echo done prior to transfer to [facility]. Hemovac, dressing change, and bonilla applied.

## 2019-03-05 NOTE — PLAN OF CARE
A&O x4. Up with one and a walker. CMS intact. No CP reported. Tele SR. Lungs clear. Tolerating full liquids for per shift. No complaints of nausea. Voiding without difficulty. Urinal at bedside. Trops neg x2. CT Neg. Needs two more bags for K+ protocol recheck. Letting pt rest. OT to assess tomorrow.

## 2019-03-05 NOTE — PLAN OF CARE
Patient alert and oriented, VSS, voids adequately per urinal, up with 1 assist, Hemovac intact, CMS intact, dressing clean, dry intact, oxycodone available.

## 2019-03-05 NOTE — CODE/RAPID RESPONSE
[facility]    RRT Note  3/5/2019   Time Called: 0912    RRT called for: Syncope     Assessment & Plan     Syncope possibly secondary to medication effect  ACS vs Pulmonary Emboli vs Vasovagal Response vs Hypoglyemia vs Volume Depletion    Alec Villanueva is a 83 year old male with a past medical history of HTN, hyperlipidemia, and thyroid nodule who was admitted on 3/4/2019 for left TKA. Mr. Villanueva was working with physical therapy this morning and after approximately 20' of walking he reported feeling lightheaded and needed to sit. After sitting, he passed out in the wheelchair and required max assist to transfer from the wheelchair to the bed due to dizziness and decreased mental status. Of note, Mr. Villanueva worked with physical therapy on 3/4/2019, he was able to ambulate 15' with assistance and did report feeling lightheaded throughout therapy which limited ambulation distance. Mr. Villanueva did received hydrochlorothiazide 37.5mg PO yesterday and this morning, as well as, oxycodone 5mg prior to physical therapy.    Upon my arrival, Mr. Villanueva was lying in bed with his eyes closed, very diaphoretic, pale, with complaints of generalized weakness, lightheadedness, and nausea. He continued to be symptomatic despite laying in bed. Blood sugar 126. -130's/70's on monitor. He had an emesis. Zofran given and one liter of NS bolus started. EKG completed, concern for possible ischemia in septal leads with T wave inversion in V1, additionally flattening of T wave noted in aVL and LBBB. EKG repeated 20 minutes later concerning bradycardia and widening of QRS in V2 and V3, ongoing T wave inversion in V1 and flattening of T wave in aVL. Peripheral pulses are strong and palpable in all four extremities, however he remains very diaphoretic and pale. HR sustaining between 49-55 on monitor. STAT cardiology consult placed and ECHO ordered.    Mr. Villanueva is oriented x4, speech clear, gross and fine motor function intact. He  reports persistent lightheadedness, weakness, and nausea with diaphoresis. He denies ever feeling like this before and denies any history of previous syncope. He denies chest pain, arm pain, teeth or jaw pain, shortness of breath, any sick contacts prior to hospitalization, and any prior history of reactions with opioid pain medication.     Approximately 45-50 minutes after initial RRT call patient reports starting to feel mildly improved. He continues to feel weak but notes improvement in his feeling of lightheadedness, diaphoresis, and nausea. He is more interactive and now reporting that he does not recall using opioid pain medication prior to this surgical procedure.     Echocardiogram shows evidence of a right ventricular dilation plan to completed a CT scan for PE protocol and [place] [facility]     Hypertension, personal history: On hydrochlorothiazide 37.5mg PO daily at home. Home dose of Hydrochlorothiazide was restarted dose POD 0. Mr. Villanueva noted some lightheadedness with PT last evening and throughout therapy session today. Intake and Output per chart review indicates a net negative of 1100.    - Hold hydrochlorothiazide 37.5mg daily, reassess tomorrow      INTERVENTIONS:  - EKG x2  - NS 1L bolus x1 STAT  - Bedside glucose STAT  - CMP, Mg, CBC, Troponin, Lactic acid STAT  - Troponin q4hr x3  - Cardiology consult STAT  - Echocardiogram STAT  - CT for PE protocol STAT  - Potassium replacement per high protocol  - [place] [facility]  - Orthostatic blood pressure    Discussed with and defer further cares to Dr. Mahan, Dr. Lobo, nursing, and hospitalist.    Interval History     Alec Villanueva is a 83 year old male who was admitted on 3/4/2019 for left TKA.    Medical history significant for: HTN, hyperlipidemia, and thyroid nodule    Code Status: Full Code    Smita Villaseñor, student APRN    I was present for and fully discussed above assessment and plan with above named student. I am in full agreement with the  above assessment and plan.    Upon arrival Mr. Villanueva was noted to be profusely diaphoretic, decreased responsiveness, and vomiting. No obvious focal deficits. With signs of shock and he did not quickly recover I began to consider cardiogenic shock and sought expert Cardiology consultation. EKG did not reveal concern for acute myocardial ischemia, echocardiogram preliminarily looks good.     - hold home hydrochlorothiazide  - transfer to [facility]  - will defer Hospitalist consult for now per Dr. Mahan   - CT PE  - further plan as above   - highly recommend optimizing non-opioid analgesia and limiting opiate use  - check BMP tomorrow for renal function     Exam:   General: 83- year old male laying in bed with profuse diaphoresis.  Lungs: Mild hypoxia. No obvious respiratory distress.  CV: S1, S2 without murmur, rub, or gallop. Distal peripheral pulses palpable.   GI: Non-distended.   Neuro: No obvious focal deficit. Awake, alert, oriented x 3.   Psychiatric: Calm, cooperative.     LUKE Rushing, CNP  Hospitalist Service, House Officer  [facility]     Text Page  Pager: [phone]    Allergies   No Known Allergies    Physical Exam   Vital Signs with Ranges:  Temp:  [97.4  F (36.3  C)-98.6  F (37  C)] 98.6  F (37  C)  Pulse:  [59-77] 71  Heart Rate:  [49-67] 53  Resp:  [8-16] 16  BP: (114-147)/(56-77) 133/66  SpO2:  [95 %-100 %] 99 %  I/O last 3 completed shifts:  In: 1950 [I.V.:1950]  Out: 1835 [Urine:1625; Drains:200; Blood:10]    Physical Exam   Constitutional: He is oriented to person, place, and time. He appears well-developed. He appears distressed.   HENT:   Head: Normocephalic and atraumatic.   Eyes: Conjunctivae and EOM are normal. Pupils are equal, round, and reactive to light.   Neck: Normal range of motion. Neck supple. No JVD present.   Cardiovascular: Regular rhythm, normal heart sounds and intact distal pulses. Exam reveals no gallop and no friction rub.   No murmur  heard.  Pulmonary/Chest: Effort normal and breath sounds normal. No respiratory distress.   Abdominal: Soft. He exhibits no distension. There is no tenderness. There is no guarding.   Musculoskeletal:        Left knee: He exhibits decreased range of motion.   Left knee: Ace wrap present MARCO ANTONIO, hemavac drain in place   Neurological: He is oriented to person, place, and time. No cranial nerve deficit.   Skin: Skin is warm. Capillary refill takes less than 2 seconds. He is diaphoretic. There is pallor.   Psychiatric: His speech is normal. He is slowed and withdrawn. Cognition and memory are normal.       Data     EKG:  Interpreted by Smita Villaseñor  Time reviewed: 0925  Symptoms at time of EKG: Syncope   Rhythm: 1 degree AV block and LBBB  Rate: Normal  Axis: Normal  Ectopy: none  Conduction: left bundle branch block (complete) and 1st degree AV block  ST Segments/ T Waves: T wave flattening aVL and T wave inversion V1  Q Waves: none  Comparison to prior: T wave flatting aVL and T wave inversion V1    Clinical Impression: left bundle branch block and 1st degree AV block      Troponin:    Recent Labs   Lab Test 03/05/19  0942   TROPI <0.015       IMAGING: (X-ray/CT/MRI)   No results found for this or any previous visit (from the past 24 hour(s)).    CBC with Diff:  Recent Labs   Lab Test 03/05/19  0942   WBC 12.6*   HGB 12.6*   MCV 94         No results found for: RETICABSCT  No results found for: RETP    Lactic Acid:    Lab Results   Component Value Date    LACT 1.6 03/05/2019           Comprehensive Metabolic Panel:  Recent Labs   Lab 03/05/19  0942      POTASSIUM 2.9*   CHLORIDE 104   CO2 27   ANIONGAP 6   *   BUN 18   CR 0.85   GFRESTIMATED 80   GFRESTBLACK >90   ERNESTINA 8.0*   MAG 1.8   PROTTOTAL 6.7*   ALBUMIN 3.0*   BILITOTAL 1.1   ALKPHOS 46   AST 21   ALT 19         Time Spent on this Encounter   I spent 60 minutes on the unit/floor managing the care of Alec Villanueva. Over 50% of my time was spent  counseling the patient and/or coordinating care regarding services listed in this note.

## 2019-03-05 NOTE — PLAN OF CARE
OT: Orders received and chart reviewed. Holding OT eval today for pt to rest after RRT this morning. Will reschedule to tomorrow.

## 2019-03-06 ENCOUNTER — APPOINTMENT (OUTPATIENT)
Dept: OCCUPATIONAL THERAPY | Facility: CLINIC | Age: 84
DRG: 470 | End: 2019-03-06
Attending: ORTHOPAEDIC SURGERY
Payer: MEDICARE

## 2019-03-06 ENCOUNTER — APPOINTMENT (OUTPATIENT)
Dept: PHYSICAL THERAPY | Facility: CLINIC | Age: 84
DRG: 470 | End: 2019-03-06
Attending: ORTHOPAEDIC SURGERY
Payer: MEDICARE

## 2019-03-06 LAB
ANION GAP SERPL CALCULATED.3IONS-SCNC: 4 MMOL/L (ref 3–14)
BUN SERPL-MCNC: 17 MG/DL (ref 7–30)
CALCIUM SERPL-MCNC: 8.4 MG/DL (ref 8.5–10.1)
CHLORIDE SERPL-SCNC: 105 MMOL/L (ref 94–109)
CO2 SERPL-SCNC: 28 MMOL/L (ref 20–32)
CREAT SERPL-MCNC: 0.94 MG/DL (ref 0.66–1.25)
GFR SERPL CREATININE-BSD FRML MDRD: 75 ML/MIN/{1.73_M2}
GLUCOSE SERPL-MCNC: 104 MG/DL (ref 70–99)
HGB BLD-MCNC: 12.4 G/DL (ref 13.3–17.7)
POTASSIUM SERPL-SCNC: 4 MMOL/L (ref 3.4–5.3)
SODIUM SERPL-SCNC: 137 MMOL/L (ref 133–144)

## 2019-03-06 PROCEDURE — 97165 OT EVAL LOW COMPLEX 30 MIN: CPT | Mod: GO

## 2019-03-06 PROCEDURE — A9270 NON-COVERED ITEM OR SERVICE: HCPCS | Mod: GY | Performed by: ORTHOPAEDIC SURGERY

## 2019-03-06 PROCEDURE — 85018 HEMOGLOBIN: CPT | Performed by: NURSE PRACTITIONER

## 2019-03-06 PROCEDURE — 97116 GAIT TRAINING THERAPY: CPT | Mod: GP | Performed by: PHYSICAL THERAPIST

## 2019-03-06 PROCEDURE — 97110 THERAPEUTIC EXERCISES: CPT | Mod: GP | Performed by: PHYSICAL THERAPIST

## 2019-03-06 PROCEDURE — 25000132 ZZH RX MED GY IP 250 OP 250 PS 637: Mod: GY | Performed by: NURSE PRACTITIONER

## 2019-03-06 PROCEDURE — 25000132 ZZH RX MED GY IP 250 OP 250 PS 637: Mod: GY | Performed by: ORTHOPAEDIC SURGERY

## 2019-03-06 PROCEDURE — 97535 SELF CARE MNGMENT TRAINING: CPT | Mod: GO

## 2019-03-06 PROCEDURE — 25000128 H RX IP 250 OP 636: Performed by: ORTHOPAEDIC SURGERY

## 2019-03-06 PROCEDURE — 12000000 ZZH R&B MED SURG/OB

## 2019-03-06 PROCEDURE — A9270 NON-COVERED ITEM OR SERVICE: HCPCS | Mod: GY | Performed by: NURSE PRACTITIONER

## 2019-03-06 PROCEDURE — 36415 COLL VENOUS BLD VENIPUNCTURE: CPT | Performed by: NURSE PRACTITIONER

## 2019-03-06 PROCEDURE — 80048 BASIC METABOLIC PNL TOTAL CA: CPT | Performed by: NURSE PRACTITIONER

## 2019-03-06 RX ORDER — TRAMADOL HYDROCHLORIDE 50 MG/1
50 TABLET ORAL EVERY 6 HOURS PRN
Qty: 30 TABLET | Refills: 0 | Status: SHIPPED | OUTPATIENT
Start: 2019-03-06

## 2019-03-06 RX ORDER — TRAMADOL HYDROCHLORIDE 50 MG/1
50 TABLET ORAL EVERY 6 HOURS PRN
Status: DISCONTINUED | OUTPATIENT
Start: 2019-03-06 | End: 2019-03-07 | Stop reason: HOSPADM

## 2019-03-06 RX ORDER — AMOXICILLIN 250 MG
1 CAPSULE ORAL 2 TIMES DAILY
Qty: 50 TABLET | Refills: 0 | Status: SHIPPED | OUTPATIENT
Start: 2019-03-06

## 2019-03-06 RX ADMIN — POTASSIUM CHLORIDE 20 MEQ: 1500 TABLET, EXTENDED RELEASE ORAL at 16:41

## 2019-03-06 RX ADMIN — ACETAMINOPHEN 975 MG: 325 TABLET, FILM COATED ORAL at 14:54

## 2019-03-06 RX ADMIN — SENNOSIDES AND DOCUSATE SODIUM 1 TABLET: 8.6; 5 TABLET ORAL at 08:00

## 2019-03-06 RX ADMIN — ENOXAPARIN SODIUM 40 MG: 40 INJECTION SUBCUTANEOUS at 08:00

## 2019-03-06 RX ADMIN — ACETAMINOPHEN 975 MG: 325 TABLET, FILM COATED ORAL at 21:01

## 2019-03-06 RX ADMIN — POTASSIUM CHLORIDE 20 MEQ: 1500 TABLET, EXTENDED RELEASE ORAL at 00:28

## 2019-03-06 RX ADMIN — TRAMADOL HYDROCHLORIDE 50 MG: 50 TABLET, COATED ORAL at 16:41

## 2019-03-06 RX ADMIN — ACETAMINOPHEN 975 MG: 325 TABLET, FILM COATED ORAL at 05:25

## 2019-03-06 RX ADMIN — SENNOSIDES AND DOCUSATE SODIUM 1 TABLET: 8.6; 5 TABLET ORAL at 21:01

## 2019-03-06 ASSESSMENT — ACTIVITIES OF DAILY LIVING (ADL)
ADLS_ACUITY_SCORE: 12
PREVIOUS_RESPONSIBILITIES: HOUSEKEEPING;LAUNDRY;SHOPPING;MEDICATION MANAGEMENT;FINANCES;DRIVING

## 2019-03-06 NOTE — PROGRESS NOTES
Pt transferred to 55 with belongings after calling report to MOLLY Patel.    Ida Mora RN on 3/6/2019 at 3:01 PM

## 2019-03-06 NOTE — PLAN OF CARE
Pt A&Ox4. No CP. No eps of diaphoresis or nausea per shift. VSS. K+ replaced. Pt has recheck at 2100. Trops neg x2 still.

## 2019-03-06 NOTE — PLAN OF CARE
OT: Evaluation and treatment initiated. Pt lives in a rambler style home with his spouse. Prior pt independent in all I/ADLs.   Discharge Planner OT   Patient plan for discharge: Home Per BPCI   Current status: Pt went from sit<>Stand with CGA. Pt ambulated to the bathroom with CGA and walker and extra time. Pt transferred to/from the toilet with minimum assist. Pt transferred to the bedside chair with CGA-minimum assist.   Barriers to return to prior living situation: Current level of assist   Recommendations for discharge: Home with assist for I/ADLs as needed   Rationale for recommendations: Anticipate that with daily therapy, pt will progress in established IP OT goal areas.        Entered by: Julián Daniel 03/06/2019 8:30 AM

## 2019-03-06 NOTE — PROGRESS NOTES
[facility]opedic Post-Op / Progress Note  Alec Villanueva is a 83 year old male    Today's Date:3/6/2019  Admission Date: 3/4/2019  POD # TKA         Interval History:   doing well with regard to TKA mobilization.  Thanks to Dr. Lobo and all involved in his cardiac evaluation this past day  He is ready for transfer back to [facility] on 55 now.  Plan discharge home tomorrow.            Physical Exam:   All vitals have been reviewed  Temperatures:  Current - Temp: 98.1  F (36.7  C); Max - Temp  Av.7  F (37.1  C)  Min: 98.1  F (36.7  C)  Max: 99.4  F (37.4  C)  Pulse range: Pulse  Av  Min: 77  Max: 77  Blood pressure range: Systolic (24hrs), Av , Min:121 , Max:142   ; Diastolic (24hrs), Av, Min:56, Max:84      Intake/Output Summary (Last 24 hours) at 3/6/2019 1246  Last data filed at 3/6/2019 0800  Gross per 24 hour   Intake 680 ml   Output 1225 ml   Net -545 ml       Wound clean and dry with minimal or no drainage.  Surrounding skin with minimal erythema.          Data:   All laboratory data related to this surgery reviewed      Lab Results   Component Value Date     2019    POTASSIUM 4.0 2019    CHLORIDE 105 2019    CO2 28 2019     (H) 2019     Lab Results   Component Value Date    HGB 12.4 (L) 2019    HGB 12.6 (L) 2019    HGB 12.6 (L) 2019     Platelet Count (10e9/L)   Date Value   2019 182   2019 170   2019 220       All imaging studies related to this surgery reviewed  Hardware is intact and in good approximation         Assessment and Plan:    Assessment:  Doing well.  No excessive bleeding  Pain well-controlled.  Tolerating physical therapy and rehabilitation well.    Plan:  Continue physical therapy  Pain control medication: Will switch to Tramadol.    Discharge plan: Home tomorrow with family after AM PT    Seamus Mahan MD

## 2019-03-06 NOTE — PROGRESS NOTES
Normal troponin levels; unless recurrent or new symptoms, would not pursue further cardiac evaluation during this inpatient hospitalization.    Recommend follow-up with his physician.  He may benefit from eventual outpatient stress study (lexiscan stress nuclear or CMRI).    Will sign off; please call if questions.  Thank you.

## 2019-03-06 NOTE — DISCHARGE INSTRUCTIONS
DISCHARGE INSTRUCTIONS AFTER YOUR TOTAL KNEE REPLACEMENT     STEPHANIE JORGENSEN MD       Instructions to care for your wound at home:   Change the dressing daily.  Inspect your incision at the time of dressing change for increased redness, tenderness, swelling, or drainage along the incision line.  Some bruising or discoloration is usually present, but call my office for any changes in appearance that concern you.  Also call if you develop a fever above 101 degrees.     You may shower directly over the wound beginning 4 days after your surgery, but do not submerge the wound under water until after your post operative visit when the sutures are removed and the wound is completely sealed without drainage.    Activities:  Physical activity may be resumed gradually according to your comfort level. You may bear weight on your operative leg as tolerated with your crutches or walker as instructed by your therapist.  Follow your home exercise program.  Ice your knee after exercising.        Wear your knee immobilizer at night only until your office visit in 2 weeks to maintain full knee extension.  Do not use the immobilizer during the day unless otherwise instructed.      Wear the anti-embolism stockings day and night until seen in the office for your post operative visit. Remove them twice daily for one hour at a time. Keep the compression stockings flat on your leg.  Do not allow them to roll up or crease your skin.  Call if you develop calf pain.     Outpatient Physical Therapy and home exercises:  Outpatient physical therapy visits are required following discharge from the hospital. The referral for these outpatient therapy visits is routinely given to you at the time of your surgical scheduling. You should have already scheduled your therapy sessions in advance.  If you have not done so, please immediately call the therapy site of your choice to schedule the physical therapy regimen that has been prescribed for you.  You  may discontinue the crutches or walker per the therapist's recommendation.      Medications:  New medications for you on discharge will include a pain medication, a stool softener while on the narcotic pain medication, and a blood thinner.  Detailed instructions will come with those medications.  You will also receive instructions on when to resume your home medications.     If you routinely take Aspirin 81 mg, hold the Aspirin while taking the formal blood thinner medication. Then take Aspirin 325 mg (1 tablet) twice daily for 4 weeks.  Then resume your Aspirin 81 mg daily if that is your routine.        Antibiotic coverage will be needed before any type of dental procedure.  This is a life long recommendation.  You should notify your dentist of your total knee surgery and call your dentist or our office one week before a dental appointment for antibiotics.        Clinic follow-up appointment:  Your clinic follow-up appointment has been prearranged.  Call [phone] with any questions.    Seamus Mahan MD

## 2019-03-06 NOTE — DISCHARGE SUMMARY
DISCHARGE SUMMARY    NAME:  Alec Villanueva  AGE:  83 year old  YOB: 1935  MRN#:  [id number]    Alec Villanueva was admitted for elective total knee arthroplasty.  The surgery was performed on 3/4/2019.  The postoperative course is documented in the medical record.  There were no complications with regard to his knee. On the first day after surgery, he had a syncopal episode while walking back to his room after PT.  He underwent a cardiac evaluation from Dr. Lisbeth Lobo and was cleared to resume the post op rehab. The patient was felt ready for discharge home on POD #3 with family with post-discharge physical therapy and outpatient visit prearranged.     Lab Results   Component Value Date    HGB 12.4 (L) 03/06/2019    HGB 12.6 (L) 03/05/2019    HGB 12.6 (L) 03/05/2019       The patient received 0 units transfusion.      FINAL DISCHARGE DIAGNOSIS:  Degenerative osteoarthritis knee.               Acute blood loss anemia     SURGICAL PROCEDURE THIS ADMISSION:   total knee arthroplasty.         STEPHANIE JORGENSEN MD      CC: Fax [phone]         Stephanie Jorgensen MD

## 2019-03-06 NOTE — PROGRESS NOTES
03/06/19 0740   Quick Adds   Type of Visit Initial Occupational Therapy Evaluation   Living Environment   Lives With spouse   Living Arrangements house  (rambler style home )   Home Accessibility stairs to enter home   Transportation Anticipated family or friend will provide  (Pt does drive. )   Living Environment Comment Spouse can assist as needed and can assist in driving. Bedroom and bathroom on the main level. Walk in shower.    Self-Care   Usual Activity Tolerance good   Current Activity Tolerance moderate   Equipment Currently Used at Home shower chair  (Has a cane and walker )   Functional Level   Ambulation 0-->independent   Transferring 0-->independent   Toileting 0-->independent   Bathing 0-->independent   Dressing 0-->independent   Fall history within last six months no   General Information   Onset of Illness/Injury or Date of Surgery - Date 03/04/19   Referring Physician Seamus Mahan MD   Patient/Family Goals Statement Home    Additional Occupational Profile Info/Pertinent History of Current Problem LEFT TOTAL KNEE ARTHROPLASTY    Precautions/Limitations fall precautions   Cognitive Status Examination   Orientation orientation to person, place and time   Level of Consciousness alert   Sensory Examination   Sensory Quick Adds No deficits were identified   Pain Assessment   Patient Currently in Pain Yes, see Vital Sign flowsheet   Mobility   Bed Mobility Bed mobility skill: Sit to supine;Bed mobility skill: Supine to sit   Bed Mobility Skill: Supine to Sit   Level of [place]: Supine/Sit contact guard   Transfer Skills   Transfer Transfer Safety Analysis Bed/Chair;Transfer Skill: Stand to Sit;Transfer Safety Analysis Sit/Stand   Transfer Skill: Bed to Chair/Chair to Bed   Level of [place]: Bed to Chair contact guard   Transfer Skill: Sit to Stand   Level of [place]: Sit/Stand contact guard   Toilet Transfer   Toilet Transfer Toilet Transfer Safety Analysis;Toilet Transfer  "Skill   Transfer Skill: Toilet Transfer   Level of [place]: Toilet minimum assist (75% patients effort)   Lower Body Dressing   Level of [place]: Dress Lower Body minimum assist (75% patients effort)   Assistive Device reacher;sock-aid   Instrumental Activities of Daily Living (IADL)   Previous Responsibilities housekeeping;laundry;shopping;medication management;finances;driving   General Therapy Interventions   Planned Therapy Interventions ADL retraining;IADL retraining;transfer training   Clinical Impression   Criteria for Skilled Therapeutic Interventions Met yes, treatment indicated   OT Diagnosis Decreased independence for I/ADls    Influenced by the following impairments Decreased independence for I/ADls    Assessment of Occupational Performance 1-3 Performance Deficits   Identified Performance Deficits Decreased independence for I/ADls (dressing, bathing, toileting)   Clinical Decision Making (Complexity) Low complexity   Therapy Frequency daily   Predicted Duration of Therapy Intervention (days/wks) 3 days    Anticipated Discharge Disposition Home with Assist   Risks and Benefits of Treatment have been explained. Yes   Patient, Family & other staff in agreement with plan of care Yes   [facility] \"6 Clicks\"   2016, Trustees of [facility], under license to [employer].  All rights reserved.   6 Clicks Short Forms Daily Activity Inpatient Short Form   [facility]  \"6 Clicks\" Daily Activity Inpatient Short Form   1. Putting on and taking off regular lower body clothing? 3 - A Little   2. Bathing (including washing, rinsing, drying)? 3 - A Little   3. Toileting, which includes using toilet, bedpan or urinal? 3 - A Little   4. Putting on and taking off regular upper body clothing? 4 - None   5. Taking care of personal grooming such as brushing teeth? 3 - A Little   6. Eating meals? 4 - None   Daily Activity Raw Score (Score out of 24.Lower scores equate to lower " levels of function) 20   Total Evaluation Time   Total Evaluation Time (Minutes) 8

## 2019-03-06 NOTE — PLAN OF CARE
Discharge Planner PT   Patient plan for discharge: Home with OP PT and spouse assist, per [facility]PCI plan  Current status: Pt transfers sit <> stand and ambulates 100' x 1 and 20' x 1 with FWW and CGA. Pt ascends/descends 5 stairs two times consecutively with 2 rails and CGA. Pt requires min A for sit > supine for LLE management. Pt tolerates supine ankle pumps, quad sets, hamstring sets, heel slides, SAQ, and SLR each x 10.  Barriers to return to prior living situation: stairs to enter home with no rails, has assist of spouse at home, already has FWW and SEC at home   Recommendations for discharge: Home with OP PT and spouse assist, per BPCI plan  Rationale for recommendations:  Pending improvement and medical status, anticipate will be able to meet mobility goals for home and attend OP PT to progress knee strength and ROM         Entered by: Daphney Zamudio 03/06/2019 9:55 AM

## 2019-03-06 NOTE — PLAN OF CARE
Pt AO4.  Declined pain medications this shift.  Using ice.  VSS on RA.  Denies CP or SOB or dizziness.  Tele NSR.  CMS intact.  Dressing CDI.  Replaced K, recheck in AM.

## 2019-03-07 ENCOUNTER — APPOINTMENT (OUTPATIENT)
Dept: PHYSICAL THERAPY | Facility: CLINIC | Age: 84
DRG: 470 | End: 2019-03-07
Attending: ORTHOPAEDIC SURGERY
Payer: MEDICARE

## 2019-03-07 ENCOUNTER — APPOINTMENT (OUTPATIENT)
Dept: OCCUPATIONAL THERAPY | Facility: CLINIC | Age: 84
DRG: 470 | End: 2019-03-07
Attending: ORTHOPAEDIC SURGERY
Payer: MEDICARE

## 2019-03-07 VITALS
DIASTOLIC BLOOD PRESSURE: 61 MMHG | RESPIRATION RATE: 16 BRPM | TEMPERATURE: 98.3 F | HEART RATE: 82 BPM | BODY MASS INDEX: 27.96 KG/M2 | WEIGHT: 211 LBS | HEIGHT: 73 IN | OXYGEN SATURATION: 96 % | SYSTOLIC BLOOD PRESSURE: 125 MMHG

## 2019-03-07 LAB
CREAT SERPL-MCNC: 0.9 MG/DL (ref 0.66–1.25)
GFR SERPL CREATININE-BSD FRML MDRD: 79 ML/MIN/{1.73_M2}
INTERPRETATION ECG - MUSE: NORMAL
INTERPRETATION ECG - MUSE: NORMAL
PLATELET # BLD AUTO: 187 10E9/L (ref 150–450)
POTASSIUM SERPL-SCNC: 4.5 MMOL/L (ref 3.4–5.3)

## 2019-03-07 PROCEDURE — 97110 THERAPEUTIC EXERCISES: CPT | Mod: GP | Performed by: PHYSICAL THERAPIST

## 2019-03-07 PROCEDURE — 82565 ASSAY OF CREATININE: CPT | Performed by: ORTHOPAEDIC SURGERY

## 2019-03-07 PROCEDURE — 85049 AUTOMATED PLATELET COUNT: CPT | Performed by: ORTHOPAEDIC SURGERY

## 2019-03-07 PROCEDURE — 36415 COLL VENOUS BLD VENIPUNCTURE: CPT | Performed by: ORTHOPAEDIC SURGERY

## 2019-03-07 PROCEDURE — 97535 SELF CARE MNGMENT TRAINING: CPT | Mod: GO | Performed by: OCCUPATIONAL THERAPY ASSISTANT

## 2019-03-07 PROCEDURE — 25000132 ZZH RX MED GY IP 250 OP 250 PS 637: Mod: GY | Performed by: ORTHOPAEDIC SURGERY

## 2019-03-07 PROCEDURE — A9270 NON-COVERED ITEM OR SERVICE: HCPCS | Mod: GY | Performed by: ORTHOPAEDIC SURGERY

## 2019-03-07 PROCEDURE — 25000128 H RX IP 250 OP 636: Performed by: ORTHOPAEDIC SURGERY

## 2019-03-07 PROCEDURE — 84132 ASSAY OF SERUM POTASSIUM: CPT | Performed by: ORTHOPAEDIC SURGERY

## 2019-03-07 PROCEDURE — 97116 GAIT TRAINING THERAPY: CPT | Mod: GP | Performed by: PHYSICAL THERAPIST

## 2019-03-07 PROCEDURE — 97530 THERAPEUTIC ACTIVITIES: CPT | Mod: GO | Performed by: OCCUPATIONAL THERAPY ASSISTANT

## 2019-03-07 RX ADMIN — ACETAMINOPHEN 975 MG: 325 TABLET, FILM COATED ORAL at 06:46

## 2019-03-07 RX ADMIN — TRAMADOL HYDROCHLORIDE 50 MG: 50 TABLET, COATED ORAL at 06:46

## 2019-03-07 RX ADMIN — ENOXAPARIN SODIUM 40 MG: 40 INJECTION SUBCUTANEOUS at 09:13

## 2019-03-07 RX ADMIN — TRAMADOL HYDROCHLORIDE 50 MG: 50 TABLET, COATED ORAL at 12:40

## 2019-03-07 RX ADMIN — SENNOSIDES AND DOCUSATE SODIUM 1 TABLET: 8.6; 5 TABLET ORAL at 09:13

## 2019-03-07 ASSESSMENT — ACTIVITIES OF DAILY LIVING (ADL)
ADLS_ACUITY_SCORE: 12

## 2019-03-07 NOTE — PLAN OF CARE
Discharge Planner OT   Patient plan for discharge: Home Per BPCI   Current status: pt SBA supine to/from sit EOB, SBA amb with FWW to/from bathroom, mod I with RTS to complete toilet transfer, LE dressing independent pants, will have assist for socks/shoes, verbalized good understanding of safety with completing shower transfer. Pt stated he has all AE needed at this time, family to assist as needed for ADLS/IADLS  Barriers to return to prior living situation: Current level of assist   Recommendations for discharge: Home with assist for I/ADLs as needed per plan established by the Occupational Therapist  Rationale for recommendations: pt will have assist as needed for ADLS/IADLS at home         Entered by: Giuliana Melton 03/07/2019 7:53 AM      Pt to discharge home today with assist as needed, GOALS PARTIALLY MET, see discharge summary

## 2019-03-07 NOTE — PLAN OF CARE
Pt A&Ox4, VSS, CMS intact, dressing changed, incision C,D,I with staples, up with SBA and walker, voiding, tramadol for pain management, tolerating regular diet, tele NSR. Pt discharged to home with spouse. Pt verbalizes understanding of discharge instructions/medications/follow up plan. Pt spouse present during education.

## 2019-03-07 NOTE — PLAN OF CARE
Discharge Planner PT   Patient plan for discharge: Home with wife and adult daughter who is hear from [place] for three weeks to assist.  Current status: CGA for sit to and from supine and sit to and from stand.  Ambulated 150' with WW And CGA along with cueing for heel strike and toe off on left in effort to bend knee wile walking.  No dizziness noted by patient.  BP prior to ambulating was 137/86 and afterwards 122/77  Barriers to return to prior living situation: SBA with all mobility  Recommendations for discharge: per BPCI plan home with OP PT  Rationale for recommendations: Pt will need A with all mob at home to prevent falls.  Would benefit from cont therapy to restore I and safety with functional mobility       Entered by: Leana Morley 03/07/2019 1:05 PM

## 2019-03-07 NOTE — PLAN OF CARE
Physical Therapy Discharge Summary    Reason for therapy discharge:    Discharged to home with outpatient therapy.    Progress towards therapy goal(s). See goals on Care Plan in [facility] electronic health record for goal details.  Goals partially met.  Barriers to achieving goals:   limited tolerance for therapy and discharge from facility.    Therapy recommendation(s):    Continued therapy is recommended.  Rationale/Recommendations:  To increase ROM, strength, and functional mobility.

## 2019-03-07 NOTE — PLAN OF CARE
VSS on RA. CMS intact. Dressing CDI. KI on at hs. Taking tramadol for pain. Voiding adequately in urinal. Continue to monitor.

## 2019-03-07 NOTE — PLAN OF CARE
Low grade temp 99.9, encouraged IS  Other vitals stable. CMS intact. Up with 1 and walker. Pain managed with Tramadol and scheduled Tylenol. Dressing changed. Potassium replaced, recheck tomorrow morning. Alert and orient x 4. Discharge home tomorrow.

## 2019-03-08 ENCOUNTER — TELEPHONE (OUTPATIENT)
Dept: CARDIOLOGY | Facility: CLINIC | Age: 84
End: 2019-03-08

## 2019-03-08 NOTE — TELEPHONE ENCOUNTER
"Patient was evaluated by cardiology while inpatient for TKA. Developed post procedure nausea, diaphoresis, weakness, bradycardia HR 40's after completing PT. Troponins remained WNL Dr. Lobo consulted and plan as follows: \"Recommend follow-up with his physician. He may benefit from eventual outpatient stress study (lexiscan stress nuclear or CMRI).\" Attempted to call patient to discuss any post hospital d/c questions he may have  and confirm f/u appts, but no answer. VM left to return my phone call. Will verify that pt has scheduled a f/u OV with his PMD. KALA Granados RN.  "

## 2019-03-11 NOTE — TELEPHONE ENCOUNTER
Pt returned my phone call. Denies any further lightheadedness, nausea, weakness or diaphoresis since being home. Encouraged pt to schedule f/u OV with PMD to f/u post hospital discharge, to determine if any further OP cardiac f/u needed as below. Verbalized understanding and will schedule PMD OV as recommended. KALA Granados RN.

## 2023-08-22 NOTE — CONSULTS
Consult Date:  03/05/2019      CARDIOLOGY CONSULTATION      PATIENT HISTORY:  Mr. Alec Villanueva was seen urgently at the request of Lacy Angulo NP.  He is 83 years old and underwent total knee arthroplasty yesterday.  This was an elective procedure and today, he became suddenly weak and diaphoretic as well as nauseated after his first physical therapy session.  He returned to his room, his heart rate was reportedly in the 40s beats per minute and his blood pressure was about 120 mmHg.  He vomited with Zofran being given.  He was seen urgently by Ms. Angulo. She was appropriately concerned over the possibility of a perioperative myocardial infarct and requested a stat Cardiology consultation.      Mr. Villanueva denies any chest, neck, arm or back discomfort.  He denies a prior history of similar symptoms.  He was already starting to feel better.  Again, he denied shortness of breath or chest discomfort.  He had no visual symptoms, had not felt syncopal but just felt weak all over.  He had received narcotic analgesia for a second dose this morning and he had completed physical therapy without symptoms.  It was when he was walking back to his room that he became suddenly weak.  He denied significant knee discomfort despite the recent surgery.      His EKG demonstrated sinus rhythm without acute ischemic ST segment changes.  An urgent echocardiogram was performed and reviewed as the patient was being seen.  There were no wall motion abnormalities.  The right ventricle looked mildly enlarged.      Mr. Villanueva denies prior history of heart disease.  He denies prior chest, neck, arm or back discomfort.  He does have a history of hypertension and dyslipidemia.  Osteoarthritis resulted in his left total knee arthroplasty yesterday.  He denied a family history of heart disease.  He smoked in the past but had not used tobacco for many years.  He does not have diabetes mellitus.      PAST MEDICAL HISTORY:   1.  Total knee  patient received TNK for stroke like symptoms earlier this afternoon arthroplasty.   2.  Thyroid nodule.   3.  History of prior cataract surgery.   4.  Hypertension.   5.  Dyslipidemia.   6.  Left inguinal hernia repair.   7.  Prior shoulder arthroplasty, bilateral.      FAMILY HISTORY:  There is a reported family history of heart disease in his mother at age 82 and in his father at age 71 years.      SOCIAL HISTORY:  The patient is .  He is a retired banker.  He is a former smoker.      REVIEW OF SYSTEMS:  Negative except as noted above.      MEDICATIONS:   1.  Include Aspirin 81 mg p.o. daily.   2.  Vitamin D 1000 units p.o. daily.   3.  Coenzyme Q 100 mg p.o. daily.   4.  Flonase inhaler with 2 sprays to each nostril daily.   5.  Folic acid 400 mcg p.o. as used by the patient,   6.  Hydrochlorothiazide 37.5 mg p.o. daily.   7.  Omega-3 fatty acids 500 mg p.o. daily.      PHYSICAL EXAMINATION:   GENERAL:  This is a man who appears younger than his stated age.  He was comfortable when seen.  He was in no apparent distress and was alert and oriented to person, place and time.   VITAL SIGNS:  Blood pressure was 126/65 mmHg, heart rate approximately 53 beats per minute and regular, respiratory rate 14-18 per minute.   HEAD:  Normocephalic.  Eyes were nonicteric.  Neck was free of thyromegaly.  Carotid upstrokes were normal bilaterally.   CHEST:  Clear to auscultation and breathing was unlabored.   CARDIAC:  On cardiac auscultation, there was an S1 and S2 without extra sounds or murmur.  The rhythm was regular.   EXTREMITIES:  There is no peripheral pedal edema.   NEUROLOGIC:  Facies were symmetric.  He moved all extremities without focal limitation.      LABORATORY DATA:  EKG was as described.  The echocardiogram was as described.  Other laboratories included a white blood cell count 12.6, hemoglobin 12.6 and platelet count 182,000.  The sodium was 137, potassium was 2.9, BUN 18 and creatinine 0.85.      ASSESSMENT AND RECOMMENDATIONS:  It is likely that Mr. Villanueva has experienced  a vagal episode which would not be surprising given that this was his first physical therapy episode after total knee arthroplasty yesterday and he had received narcotic analgesia today.  He was already starting to feel better.      It was recommended that serial troponins be obtained.  His low potassium (likely the result of chronic hydrochlorothiazide use) has probably contributed to these symptoms.  It would be recommended that his hydrochlorothiazide be changed to either a combination diuretic such as Maxzide or to spironolactone to prevent serious hypokalemia.  The potassium is low enough that it likely contributed to his symptoms.      The plans for the Hospitalist Service were to evaluate the possibility of a pulmonary embolism, though he is on DVT prophylaxis.  Nonetheless, that seems very appropriate given his mild right ventricular (RV) dilation. Since he has a reported observation of snoring (though he does deny ever hearing that he snores), he may have some degree of sleep-disordered breathing as the etiology for the RV dilation.     Obviously, if he has recurrent symptoms or the troponins are abnormal, Mr. Villanueva will be reevaluated, but at this time, he would appear to be doing well.  Again, this was most likely a vagal episode as a result of multiple etiologies and it was likely contributed to by his marked hypokalemia.         BINTA AVALOS MD          D: 2019   T: 2019   MT: AS      Name:     SUGEY VILLANUEVA   MRN:      [id number]        Account:       [id number]   :      1935           Consult Date:  2019      Document: [id number]

## (undated) DEVICE — BLADE SAW SAGITTAL STRK 21X90X1.27MM HD SYS 6 6221-127-090

## (undated) DEVICE — SPONGE LAP 18X18" X8435

## (undated) DEVICE — ESU PENCIL W/SMOKE EVAC NEPTUNE STRYKER 0703-046-000

## (undated) DEVICE — GLOVE PROTEXIS W/NEU-THERA 8.0  2D73TE80

## (undated) DEVICE — BONE CEMENT MIXEVAC III HI VAC KIT  0206-015-000

## (undated) DEVICE — SUCTION IRR SYSTEM W/O TIP INTERPULSE HANDPIECE 0210-100-000

## (undated) DEVICE — MANIFOLD NEPTUNE 4 PORT 700-20

## (undated) DEVICE — NDL SPINAL 18GA 3.5" 405184

## (undated) DEVICE — PREP CHLORAPREP 26ML TINTED ORANGE  260815

## (undated) DEVICE — DRSG KERLIX FLUFFS X5

## (undated) DEVICE — GOWN IMPERVIOUS SPECIALTY XL/XLONG 39049

## (undated) DEVICE — LINEN TOWEL PACK X5 5464

## (undated) DEVICE — BLADE SAW SAGITTAL STRK 29X83.5X1.27MM 4/2000 2108-183-000

## (undated) DEVICE — SU ETHIBOND 0 CTX CR  8X18" CX31D

## (undated) DEVICE — WRAP EZY KNEE

## (undated) DEVICE — GLOVE PROTEXIS POWDER FREE 8.5 ORTHOPEDIC 2D73ET85

## (undated) DEVICE — DRSG XEROFORM 5X9" 8884431605

## (undated) DEVICE — PACK TOTAL KNEE SOP15TKFSD

## (undated) DEVICE — GLOVE PROTEXIS POWDER FREE 8.0 ORTHOPEDIC 2D73ET80

## (undated) DEVICE — BONE CLEANING TIP INTERPULSE  0210-010-000

## (undated) DEVICE — IMM KNEE 24" 0814-2664

## (undated) DEVICE — SU VICRYL 2-0 CP-1 27" UND J266H

## (undated) DEVICE — TOURNIQUET CUFF 34" STERILE

## (undated) DEVICE — BLADE CLIPPER 4412A

## (undated) DEVICE — HOOD FLYTE W/PEELAWAY 408-800-100

## (undated) DEVICE — ESU GROUND PAD UNIVERSAL W/O CORD

## (undated) DEVICE — SU STRATAFIX PDS PLUS 1 CT-1 18" SXPP1A404

## (undated) DEVICE — SOL WATER IRRIG 1000ML BOTTLE 2F7114

## (undated) RX ORDER — CEFAZOLIN SODIUM 1 G/3ML
INJECTION, POWDER, FOR SOLUTION INTRAMUSCULAR; INTRAVENOUS
Status: DISPENSED
Start: 2019-03-04

## (undated) RX ORDER — KETOROLAC TROMETHAMINE 30 MG/ML
INJECTION, SOLUTION INTRAMUSCULAR; INTRAVENOUS
Status: DISPENSED
Start: 2019-03-04

## (undated) RX ORDER — CEFAZOLIN SODIUM 2 G/100ML
INJECTION, SOLUTION INTRAVENOUS
Status: DISPENSED
Start: 2019-03-04

## (undated) RX ORDER — FENTANYL CITRATE 50 UG/ML
INJECTION, SOLUTION INTRAMUSCULAR; INTRAVENOUS
Status: DISPENSED
Start: 2019-03-04

## (undated) RX ORDER — PROPOFOL 10 MG/ML
INJECTION, EMULSION INTRAVENOUS
Status: DISPENSED
Start: 2019-03-04

## (undated) RX ORDER — ACYCLOVIR 200 MG/1
CAPSULE ORAL
Status: DISPENSED
Start: 2019-03-04

## (undated) RX ORDER — ROPIVACAINE HYDROCHLORIDE 2 MG/ML
INJECTION, SOLUTION EPIDURAL; INFILTRATION; PERINEURAL
Status: DISPENSED
Start: 2019-03-04

## (undated) RX ORDER — ONDANSETRON 2 MG/ML
INJECTION INTRAMUSCULAR; INTRAVENOUS
Status: DISPENSED
Start: 2019-03-04

## (undated) RX ORDER — HYDROMORPHONE HYDROCHLORIDE 1 MG/ML
INJECTION, SOLUTION INTRAMUSCULAR; INTRAVENOUS; SUBCUTANEOUS
Status: DISPENSED
Start: 2019-03-04